# Patient Record
Sex: FEMALE | Race: WHITE | NOT HISPANIC OR LATINO | URBAN - METROPOLITAN AREA
[De-identification: names, ages, dates, MRNs, and addresses within clinical notes are randomized per-mention and may not be internally consistent; named-entity substitution may affect disease eponyms.]

---

## 2024-02-06 ENCOUNTER — PATIENT MESSAGE (OUTPATIENT)
Dept: PRIMARY CARE CLINIC | Facility: CLINIC | Age: 54
End: 2024-02-06
Payer: COMMERCIAL

## 2024-05-06 ENCOUNTER — TELEPHONE (OUTPATIENT)
Dept: UROGYNECOLOGY | Facility: CLINIC | Age: 54
End: 2024-05-06
Payer: COMMERCIAL

## 2024-05-06 NOTE — TELEPHONE ENCOUNTER
----- Message from Evelia Coe sent at 5/6/2024  4:30 PM CDT -----  Regarding: appt  Type:  Patient Returning Call      Name of who is calling:Ashley         What is request in detail:Patient is requesting a call back, would like set an appt as a new patient for prolapse consultation.        Can clinic reply by [facility]:no        What number to call back if not in [facility]:[phone]

## 2024-07-30 ENCOUNTER — TELEPHONE (OUTPATIENT)
Dept: UROGYNECOLOGY | Facility: CLINIC | Age: 54
End: 2024-07-30
Payer: COMMERCIAL

## 2024-07-30 NOTE — TELEPHONE ENCOUNTER
Placed call to screen for [facility] candidacy.     Ref provider: Self  Dx: Unknown    LVM with call back number and reason for calling.

## 2024-08-08 ENCOUNTER — TELEPHONE (OUTPATIENT)
Dept: UROGYNECOLOGY | Facility: CLINIC | Age: 54
End: 2024-08-08
Payer: COMMERCIAL

## 2024-10-03 ENCOUNTER — OFFICE VISIT (OUTPATIENT)
Dept: UROGYNECOLOGY | Facility: CLINIC | Age: 54
End: 2024-10-03
Payer: COMMERCIAL

## 2024-10-03 VITALS
BODY MASS INDEX: 22.2 KG/M2 | WEIGHT: 130.06 LBS | DIASTOLIC BLOOD PRESSURE: 72 MMHG | HEIGHT: 64 IN | HEART RATE: 73 BPM | SYSTOLIC BLOOD PRESSURE: 118 MMHG

## 2024-10-03 DIAGNOSIS — N81.11 CYSTOCELE, MIDLINE: ICD-10-CM

## 2024-10-03 DIAGNOSIS — N95.2 VAGINAL ATROPHY: ICD-10-CM

## 2024-10-03 DIAGNOSIS — R19.8 IRREGULAR BOWEL HABITS: ICD-10-CM

## 2024-10-03 DIAGNOSIS — N81.6 RECTOCELE, FEMALE: ICD-10-CM

## 2024-10-03 DIAGNOSIS — N81.4 UTEROVAGINAL PROLAPSE: ICD-10-CM

## 2024-10-03 DIAGNOSIS — K59.09 CHRONIC CONSTIPATION: ICD-10-CM

## 2024-10-03 DIAGNOSIS — R35.1 NOCTURIA: ICD-10-CM

## 2024-10-03 DIAGNOSIS — S22.32XS: Primary | ICD-10-CM

## 2024-10-03 PROBLEM — S22.32XB OPEN FRACTURE OF ONE RIB OF LEFT SIDE: Status: ACTIVE | Noted: 2024-10-03

## 2024-10-03 PROCEDURE — 99205 OFFICE O/P NEW HI 60 MIN: CPT | Mod: 25,S$GLB,, | Performed by: OBSTETRICS & GYNECOLOGY

## 2024-10-03 PROCEDURE — 3044F HG A1C LEVEL LT 7.0%: CPT | Mod: CPTII,S$GLB,, | Performed by: OBSTETRICS & GYNECOLOGY

## 2024-10-03 PROCEDURE — 87086 URINE CULTURE/COLONY COUNT: CPT | Performed by: OBSTETRICS & GYNECOLOGY

## 2024-10-03 PROCEDURE — 1159F MED LIST DOCD IN RCRD: CPT | Mod: CPTII,S$GLB,, | Performed by: OBSTETRICS & GYNECOLOGY

## 2024-10-03 PROCEDURE — 3078F DIAST BP <80 MM HG: CPT | Mod: CPTII,S$GLB,, | Performed by: OBSTETRICS & GYNECOLOGY

## 2024-10-03 PROCEDURE — 51701 INSERT BLADDER CATHETER: CPT | Mod: S$GLB,,, | Performed by: OBSTETRICS & GYNECOLOGY

## 2024-10-03 PROCEDURE — 3008F BODY MASS INDEX DOCD: CPT | Mod: CPTII,S$GLB,, | Performed by: OBSTETRICS & GYNECOLOGY

## 2024-10-03 PROCEDURE — 99999 PR PBB SHADOW E&M-EST. PATIENT-LVL IV: CPT | Mod: PBBFAC,,, | Performed by: OBSTETRICS & GYNECOLOGY

## 2024-10-03 PROCEDURE — 1160F RVW MEDS BY RX/DR IN RCRD: CPT | Mod: CPTII,S$GLB,, | Performed by: OBSTETRICS & GYNECOLOGY

## 2024-10-03 PROCEDURE — 3074F SYST BP LT 130 MM HG: CPT | Mod: CPTII,S$GLB,, | Performed by: OBSTETRICS & GYNECOLOGY

## 2024-10-03 RX ORDER — ESTRADIOL 0.05 MG/D
1 FILM, EXTENDED RELEASE TRANSDERMAL
COMMUNITY
Start: 2024-09-23

## 2024-10-03 RX ORDER — POLYETHYLENE GLYCOL 3350 17 G/17G
17 POWDER, FOR SOLUTION ORAL
COMMUNITY
Start: 2024-09-06

## 2024-10-03 RX ORDER — ALBUTEROL SULFATE 90 UG/1
INHALANT RESPIRATORY (INHALATION)
COMMUNITY
Start: 2024-08-12

## 2024-10-03 RX ORDER — DESVENLAFAXINE 50 MG/1
1 TABLET, FILM COATED, EXTENDED RELEASE ORAL EVERY MORNING
COMMUNITY
Start: 2010-10-02

## 2024-10-03 RX ORDER — PROGESTERONE 200 MG/1
CAPSULE ORAL
COMMUNITY
Start: 2024-08-24

## 2024-10-03 RX ORDER — ESTRADIOL 0.1 MG/G
CREAM VAGINAL
Qty: 42.5 G | Refills: 11 | Status: SHIPPED | OUTPATIENT
Start: 2024-10-03

## 2024-10-03 NOTE — PROGRESS NOTES
[facility] - UROGYNECOLOGY  [address] [address]    Ashley Whitley  [id number]  1970    Consulting Physician: Tri Jesus   GYN: MD Samantha  Primary M.D.: Dori Eden MD    Chief Complaint   Patient presents with    Vaginal Prolapse            HPI:     1)  UI:  (--) JC.  (--) UUI.  Has been doing pelvic floor PT.  No UI prior to this.  (--) pads. Daytime frequency: Q 3 hours.  Nocturia: Yes: 0-1/night.   (--) dysuria,  (--) hematuria,  (--) frequent UTIs.  (+) complete bladder emptying.    2)  POP:  Present since March.  Past introitus.  Symptoms:(+)  notices on wiping, bothered by it.  (--) vaginal bleeding. (--) vaginal discharge. (+) sexually active.  (--) dyspareunia.  (+)  Vaginal dryness.  (--) vaginal estrogen use.     3)  BM:  (--) constipation/straining. Tries not to strain. Was told has redundant colon. Thin caliber.  Just started citrucel  Takes magnesium 500 mg daily, which sometimes causes loose stool. Has SIBO. Has foul-smelling gas.  (--) chronic diarrhea. (--) hematochezia.  (--) fecal incontinence.  (--) fecal smearing/urgency.  (--) complete evacuation.  No splinting. Has bidet to help. No rectal prolapse.   --no major straining/lifting.     Past Medical History  History reviewed. No pertinent past medical history.   --menopausal symptoms: taking P4; was on combipatch--stopped and changed to estradiol patch; also using some T cream  --anxiety/panic attacks: controlled with pristiq    Past Surgical History  History reviewed. No pertinent surgical history.   --c/s x 2  --rhinoplasty    Hysterectomy: No    Past Ob History     x 1.  C/s x 2 (1st urgent).    Largest infant weight: 7#3oz.  no FAVD. yes episiotomy.      Gynecologic History  LMP: No LMP recorded. Patient is postmenopausal.  Age of menarche: 10 yo  Age of menopause: 53 but had 1 cycle 2024 (benign Bx)  Menstrual history: h/o normal  --had  bleeding end of  2024--reports Bx benign x 2: 1st insufficient, 2nd benign; no bleeding since  Pap test: , normal.  History of abnormal paps: No.  History of STIs:  No  Mammogram: Date of last: 2024.  Result: Normal  Colonoscopy: Date of last: .  Result:  normal per report (has had pre CA polyp in past).  Repeat due:  .    DEXA:  none; has recent rib Fx with hug    Family History  No family history on file.   Colon CA: Yes - sister ( )  Breast CA: Yes - MGM (80s)  GYN CA: No   CA: No    Social History  Social History     Tobacco Use   Smoking Status Former    Types: Cigarettes    Start date:    Smokeless Tobacco Never     Social History     Substance and Sexual Activity   Alcohol Use None   .    Social History     Substance and Sexual Activity   Drug Use Not on file     The patient is .  Resides in [place] [address].  Employment status: retired hairdresser.     Allergies  Review of patient's allergies indicates:   Allergen Reactions    Diphenhydramine hcl Swelling     Liquid only    Latex, natural rubber Itching and Rash       Medications  Current Outpatient Medications on File Prior to Visit   Medication Sig Dispense Refill    acetylcyst/lghavmN95/levomefol (CEREFOLIN NAC ORAL)       albuterol (PROVENTIL/VENTOLIN HFA) 90 mcg/actuation inhaler SMARTSI Puff(s) By Mouth Every 4 Hours      CMPD testosterone proprionate 2% in vanicream       desvenlafaxine succinate (PRISTIQ) 50 MG Tb24 Take 1 tablet by mouth every morning.      estradiol 0.05 mg/24 hr td ptsw (VIVELLE-DOT) 0.05 mg/24 hr 1 patch twice a week.      MIRALAX 17 gram PwPk Take 17 g by mouth.      progesterone (PROMETRIUM) 200 MG capsule        No current facility-administered medications on file prior to visit.       Review of Systems A 14 point ROS was reviewed with pertinent positives as noted above in the history of present illness.      Constitutional: negative  Eyes: negative  Endocrine: negative  Gastrointestinal:  "negative  Cardiovascular: negative  Respiratory: negative  Allergic/Immunologic: negative  Integumentary: negative  Psychiatric: negative  Musculoskeletal: negative   Ear/Nose/Throat: negative  Neurologic: negative  Genitourinary: SEE HPI  Hematologic/Lymphatic: negative   Breast: negative    Urogynecologic Exam  /72   Pulse 73   Ht 5' 4" (1.626 m)   Wt 59 kg (130 lb 1.1 oz)   BMI 22.33 kg/m²     GENERAL APPEARANCE:  The patient is well-developed, well-nourished.   Neck:  Supple with no thyromegaly, no carotid bruits.  Heart:  Regular rate and rhythm, no murmurs, rubs or gallops.  Lungs:  Clear.  No CVA tenderness.  Abdomen:  Soft, nontender, nondistended, no hepatosplenomegaly.  Incisions:  Pfannenstiel well-healed    PELVIC:    External genitalia:  Normal Bartholins, Skenes and labia bilaterally.    Urethra:  No caruncle, diverticulum or masses.  (+) hypermobility.    Vagina:  Atrophy (+) , no bladder masses or tender, no discharge.    Cervix:  normal appearance  Uterus: normal size, contour, position, consistency, mobility, non-tender  Adnexa: Not palpable.    POP-Q:  Aa +1; Ba +1; C -1; Ap 0; Bp 0; D -4.  Genital hiatus 4, perineal body 3, total vaginal length 10-11.    NEUROLOGIC:  Cranial nerves 2 through 12 intact.  Strength 5/5.  DTRs 2+ lower extremities.  S2 through 4 normal.  Sacral reflexes intact.    EXT: CHAPMAN, 2+ pulses bilaterally, no C/C/E    COUGH STRESS TEST:  negative  KEGEL: 1 /5    RECTAL:    External:  Normal, (--) hemorrhoids, (--) dovetailing.   Internal:   (--) tenderness, (--) masses, Normal resting tone, Normal active tone. Grossly heme NEG.     PVR: 20 mL    Impression    1. Open fracture of one rib of left side, sequela    2. Nocturia    3. Vaginal atrophy    4. Cystocele, midline    5. Rectocele, female    6. Uterovaginal prolapse    7. Chronic constipation    8. Irregular bowel habits        Initial Plan  The patient was counseled regarding these issues. The patient was given " a summary sheet containing each of these issues with possible options for evaluation and management. When appropriate, we also reviewed computer-generated diagrams specific to their diagnoses..  All questions were addressed to the patient's satisfaction.    1)  Rib fracture, L:  --DEXA--schedule    2)  Stage 2 cystocele/rectocele/uterine prolapse:  --discussed  --options: nothing vs PT vs pessary vs surgery   --if surgery:  robotic hysterectomy, take tubes (can keep ovaries, sacral colpopexy   --if surgery: pelvic US (reported EMBX benign)   --if surgery: bladder testing to see if you need treatment treatment for hidden stress leakage (sling vs bulking)   --if surgery: clearance per PCP (Meek) + labs (CBC, BMP, T&S)/EKG--had recent visit    3)  Vaginal atrophy (dryness):    --Vaginal estrogen:  --Use 0.5 grams of estrogen cream in vagina with applicator or dime-sized amount with finger (as far as can reach internally) nightly x 2 weeks, then twice a week thereafter.  You can also apply a dime-sized amount with your finger around the vaginal opening and inner lips at same frequency.     --Vaginal estrogen may help to decrease pain related to dryness with intercourse and urinary symptoms (urgency/frequency/UTIs) around menopause.     4)  Constipation, irregular habits:  --hydrate well  Controlling constipation may help bladder urgency/leakage and fiber may better control cholesterol and blood glucose.  Start daily fiber.  Take 1 tsp of fiber powder.  Mix in 8 oz of water.  Take x 3-5 days.  Then, increase fiber by 1 tsp every 3-5 days until stool is easy to pass, not too hard or too loose.  Stop and continue at that dose.    --continue magnesium  --keep food diary -- avoid triggers  --only take miralax on bad days: you may only need 1/4-1 capful    5)  Think about what you'd like to do.  Can message or call to let us know. If surgery, let us know at least 2 months before desired timeframe.     Approximately 60 min  were spent in consult, 90 % in discussion.     Thank you for requesting consultation of your patient.  I look forward to participating in their care.    Michael Borrego  Female Pelvic Medicine and Reconstructive Surgery  [facility] [place], [place]

## 2024-10-03 NOTE — PATIENT INSTRUCTIONS
1)  Rib fracture, L:  --DEXA--schedule    2)  Stage 2 cystocele/rectocele/uterine prolapse:  --discussed  --options: nothing vs PT vs pessary vs surgery   --if surgery:  robotic hysterectomy, take tubes (can keep ovaries, sacral colpopexy   --if surgery: pelvic US (reported EMBX benign)   --if surgery: bladder testing to see if you need treatment treatment for hidden stress leakage (sling vs bulking)   --if surgery: clearance per PCP (Meek) + labs (CBC, BMP, T&S)/EKG--had recent visit    3)  Vaginal atrophy (dryness):    --Vaginal estrogen:  --Use 0.5 grams of estrogen cream in vagina with applicator or dime-sized amount with finger (as far as can reach internally) nightly x 2 weeks, then twice a week thereafter.  You can also apply a dime-sized amount with your finger around the vaginal opening and inner lips at same frequency.     --Vaginal estrogen may help to decrease pain related to dryness with intercourse and urinary symptoms (urgency/frequency/UTIs) around menopause.     4)  Constipation, irregular habits:  --hydrate well  Controlling constipation may help bladder urgency/leakage and fiber may better control cholesterol and blood glucose.  Start daily fiber.  Take 1 tsp of fiber powder.  Mix in 8 oz of water.  Take x 3-5 days.  Then, increase fiber by 1 tsp every 3-5 days until stool is easy to pass, not too hard or too loose.  Stop and continue at that dose.    --continue magnesium  --keep food diary -- avoid triggers  --only take miralax on bad days: you may only need 1/4-1 capful    5)  Think about what you'd like to do.  Can message or call to let us know. If surgery, let us know at least 2 months before desired timeframe.

## 2024-10-04 LAB — BACTERIA UR CULT: NO GROWTH

## 2024-10-07 ENCOUNTER — APPOINTMENT (OUTPATIENT)
Dept: RADIOLOGY | Facility: HOSPITAL | Age: 54
End: 2024-10-07
Attending: OBSTETRICS & GYNECOLOGY
Payer: COMMERCIAL

## 2024-10-07 DIAGNOSIS — S22.32XS: ICD-10-CM

## 2024-10-07 PROCEDURE — 77080 DXA BONE DENSITY AXIAL: CPT | Mod: TC

## 2024-10-07 PROCEDURE — 77080 DXA BONE DENSITY AXIAL: CPT | Mod: 26,,, | Performed by: STUDENT IN AN ORGANIZED HEALTH CARE EDUCATION/TRAINING PROGRAM

## 2024-10-12 PROBLEM — R19.8 IRREGULAR BOWEL HABITS: Status: ACTIVE | Noted: 2024-10-12

## 2024-10-12 PROBLEM — N81.4 UTEROVAGINAL PROLAPSE: Status: ACTIVE | Noted: 2024-10-12

## 2024-10-12 PROBLEM — N95.2 VAGINAL ATROPHY: Status: ACTIVE | Noted: 2024-10-12

## 2024-10-12 PROBLEM — K59.09 CHRONIC CONSTIPATION: Status: ACTIVE | Noted: 2024-10-12

## 2024-10-12 PROBLEM — N81.6 RECTOCELE, FEMALE: Status: ACTIVE | Noted: 2024-10-12

## 2024-10-12 PROBLEM — N81.11 CYSTOCELE, MIDLINE: Status: ACTIVE | Noted: 2024-10-12

## 2024-10-12 PROBLEM — R35.1 NOCTURIA: Status: ACTIVE | Noted: 2024-10-12

## 2024-10-14 ENCOUNTER — TELEPHONE (OUTPATIENT)
Dept: UROGYNECOLOGY | Facility: CLINIC | Age: 54
End: 2024-10-14
Payer: COMMERCIAL

## 2024-10-14 NOTE — TELEPHONE ENCOUNTER
Contacted patient to relay Dr. Borrego's below message.  Patient reporting ready to schedule surgery.  Patient also reporting unsure when needs to have follow up DEXA scan.     Wants food diary- will send to patients email provided.   [email or web address]    Patient verbalized understanding and denies other needs at this time. Call ended.      ----- Message from Michael Borrego MD sent at 10/11/2024  9:37 PM CDT -----  1)  Please let patient know that bone density test shows osteopenia (weak bones but not osteoporosis).  Risk of fracture is not elevated.  So, she should do the following:  --Take 600 mg calcium every AM and 600 mg calcium every PM (for total of 1200 mg daily).  Take 400 IU vitamin D in AM and 400 IU vitamin D in the PM (for total of 800 IU daily).    --Start weight bearing exercises in improve bone density:  This type of exercise forces you to work against gravity. Some examples of weight-bearing exercises include weight training, walking, hiking, jogging, climbing stairs, tennis, and dancing. Examples of exercises that are not weight-bearing include swimming and bicycling. Although these activities help build and maintain strong muscles and have excellent cardiovascular benefits, they are not the best way to exercise your bones.  --Follow up for repeat DEXA (bone density testing) as scheduled.     2) Also, her urine culture was negative.   Thanks!!

## 2024-10-16 ENCOUNTER — PATIENT MESSAGE (OUTPATIENT)
Dept: UROGYNECOLOGY | Facility: CLINIC | Age: 54
End: 2024-10-16
Payer: COMMERCIAL

## 2024-10-17 DIAGNOSIS — N81.2 UTEROVAGINAL PROLAPSE, INCOMPLETE: Primary | ICD-10-CM

## 2024-10-17 DIAGNOSIS — N81.6 RECTOCELE, FEMALE: ICD-10-CM

## 2024-10-17 DIAGNOSIS — N81.11 CYSTOCELE, MIDLINE: ICD-10-CM

## 2024-10-17 DIAGNOSIS — R35.1 NOCTURIA: ICD-10-CM

## 2024-10-17 DIAGNOSIS — N81.4 UTEROVAGINAL PROLAPSE: Primary | ICD-10-CM

## 2024-10-21 ENCOUNTER — HOSPITAL ENCOUNTER (OUTPATIENT)
Dept: RADIOLOGY | Facility: OTHER | Age: 54
Discharge: HOME | End: 2024-10-21
Attending: OBSTETRICS & GYNECOLOGY
Payer: COMMERCIAL

## 2024-10-21 DIAGNOSIS — N81.2 UTEROVAGINAL PROLAPSE, INCOMPLETE: ICD-10-CM

## 2024-10-21 PROCEDURE — 76830 TRANSVAGINAL US NON-OB: CPT | Mod: 26,,, | Performed by: RADIOLOGY

## 2024-10-21 PROCEDURE — 76856 US EXAM PELVIC COMPLETE: CPT | Mod: 26,,, | Performed by: RADIOLOGY

## 2024-10-21 PROCEDURE — 76830 TRANSVAGINAL US NON-OB: CPT | Mod: TC

## 2024-10-21 PROCEDURE — 76856 US EXAM PELVIC COMPLETE: CPT | Mod: TC

## 2024-10-22 ENCOUNTER — TELEPHONE (OUTPATIENT)
Dept: UROGYNECOLOGY | Facility: CLINIC | Age: 54
End: 2024-10-22
Payer: COMMERCIAL

## 2024-10-22 NOTE — TELEPHONE ENCOUNTER
Contacted patient to relay Dr. Borrego's below message. Patient verbalized understanding and denies other needs at this time. Call ended.      ----- Message from Michael Borrego MD sent at 10/21/2024  6:07 PM CDT -----  Please let patient know that pelvic US looks ok. She has a large fibroid (benign growth) and a smaller one. These will be removed at time of hysterectomy. Ovaries were not seen, but they are sometimes difficult to see if small and behind the uterus, bowel, etc.  But, there is nothing obviously abnormal on the US. Thanks!

## 2024-12-18 ENCOUNTER — ANESTHESIA EVENT (OUTPATIENT)
Dept: SURGERY | Facility: OTHER | Age: 54
End: 2024-12-18
Payer: COMMERCIAL

## 2024-12-18 RX ORDER — PREGABALIN 75 MG/1
75 CAPSULE ORAL ONCE
OUTPATIENT
Start: 2024-12-18 | End: 2024-12-18

## 2024-12-18 RX ORDER — LIDOCAINE HYDROCHLORIDE 10 MG/ML
0.5 INJECTION, SOLUTION EPIDURAL; INFILTRATION; INTRACAUDAL; PERINEURAL ONCE
OUTPATIENT
Start: 2024-12-18 | End: 2024-12-18

## 2024-12-18 RX ORDER — ACETAMINOPHEN 500 MG
1000 TABLET ORAL
OUTPATIENT
Start: 2024-12-18 | End: 2024-12-18

## 2024-12-18 RX ORDER — SODIUM CHLORIDE, SODIUM LACTATE, POTASSIUM CHLORIDE, CALCIUM CHLORIDE 600; 310; 30; 20 MG/100ML; MG/100ML; MG/100ML; MG/100ML
INJECTION, SOLUTION INTRAVENOUS CONTINUOUS
OUTPATIENT
Start: 2024-12-18

## 2024-12-19 ENCOUNTER — OFFICE VISIT (OUTPATIENT)
Dept: UROGYNECOLOGY | Facility: CLINIC | Age: 54
End: 2024-12-19
Payer: COMMERCIAL

## 2024-12-19 ENCOUNTER — PROCEDURE VISIT (OUTPATIENT)
Dept: UROGYNECOLOGY | Facility: CLINIC | Age: 54
End: 2024-12-19
Payer: COMMERCIAL

## 2024-12-19 ENCOUNTER — HOSPITAL ENCOUNTER (OUTPATIENT)
Dept: PREADMISSION TESTING | Facility: OTHER | Age: 54
Discharge: HOME | End: 2024-12-19
Attending: OBSTETRICS & GYNECOLOGY
Payer: COMMERCIAL

## 2024-12-19 VITALS
DIASTOLIC BLOOD PRESSURE: 74 MMHG | HEIGHT: 64 IN | WEIGHT: 129.44 LBS | BODY MASS INDEX: 22.1 KG/M2 | SYSTOLIC BLOOD PRESSURE: 116 MMHG

## 2024-12-19 VITALS
RESPIRATION RATE: 16 BRPM | BODY MASS INDEX: 21.34 KG/M2 | SYSTOLIC BLOOD PRESSURE: 122 MMHG | OXYGEN SATURATION: 99 % | DIASTOLIC BLOOD PRESSURE: 74 MMHG | HEART RATE: 78 BPM | HEIGHT: 64 IN | WEIGHT: 125 LBS | TEMPERATURE: 98 F

## 2024-12-19 DIAGNOSIS — Z01.818 PREOP TESTING: Primary | ICD-10-CM

## 2024-12-19 DIAGNOSIS — N81.4 UTEROVAGINAL PROLAPSE: Primary | ICD-10-CM

## 2024-12-19 DIAGNOSIS — N81.2 UTEROVAGINAL PROLAPSE, INCOMPLETE: ICD-10-CM

## 2024-12-19 DIAGNOSIS — N95.2 VAGINAL ATROPHY: ICD-10-CM

## 2024-12-19 DIAGNOSIS — K59.09 CHRONIC CONSTIPATION: ICD-10-CM

## 2024-12-19 DIAGNOSIS — N81.11 CYSTOCELE, MIDLINE: ICD-10-CM

## 2024-12-19 DIAGNOSIS — R35.1 NOCTURIA: ICD-10-CM

## 2024-12-19 DIAGNOSIS — N39.3 SUI (STRESS URINARY INCONTINENCE, FEMALE): ICD-10-CM

## 2024-12-19 DIAGNOSIS — N81.6 RECTOCELE, FEMALE: ICD-10-CM

## 2024-12-19 LAB
BASOPHILS # BLD AUTO: 0.02 K/UL (ref 0–0.2)
BASOPHILS NFR BLD: 0.3 % (ref 0–1.9)
BILIRUB SERPL-MCNC: NORMAL MG/DL
BLOOD URINE, POC: NORMAL
CLARITY, POC UA: CLEAR
COLOR, POC UA: NORMAL
DIFFERENTIAL METHOD BLD: ABNORMAL
EOSINOPHIL # BLD AUTO: 0 K/UL (ref 0–0.5)
EOSINOPHIL NFR BLD: 0.3 % (ref 0–8)
ERYTHROCYTE [DISTWIDTH] IN BLOOD BY AUTOMATED COUNT: 13.3 % (ref 11.5–14.5)
GLUCOSE UR QL STRIP: NORMAL
HCT VFR BLD AUTO: 38.8 % (ref 37–48.5)
HGB BLD-MCNC: 13.1 G/DL (ref 12–16)
IMM GRANULOCYTES # BLD AUTO: 0.02 K/UL (ref 0–0.04)
IMM GRANULOCYTES NFR BLD AUTO: 0.3 % (ref 0–0.5)
KETONES UR QL STRIP: NORMAL
LEUKOCYTE ESTERASE URINE, POC: NORMAL
LYMPHOCYTES # BLD AUTO: 2 K/UL (ref 1–4.8)
LYMPHOCYTES NFR BLD: 26 % (ref 18–48)
MCH RBC QN AUTO: 32 PG (ref 27–31)
MCHC RBC AUTO-ENTMCNC: 33.8 G/DL (ref 32–36)
MCV RBC AUTO: 95 FL (ref 82–98)
MONOCYTES # BLD AUTO: 0.5 K/UL (ref 0.3–1)
MONOCYTES NFR BLD: 6.3 % (ref 4–15)
NEUTROPHILS # BLD AUTO: 5.1 K/UL (ref 1.8–7.7)
NEUTROPHILS NFR BLD: 66.8 % (ref 38–73)
NITRITE, POC UA: NORMAL
NRBC BLD-RTO: 0 /100 WBC
PH, POC UA: 7
PLATELET # BLD AUTO: 328 K/UL (ref 150–450)
PMV BLD AUTO: 8.5 FL (ref 9.2–12.9)
PROTEIN, POC: NORMAL
RBC # BLD AUTO: 4.09 M/UL (ref 4–5.4)
SPECIFIC GRAVITY, POC UA: 1.01
UROBILINOGEN, POC UA: NORMAL
WBC # BLD AUTO: 7.63 K/UL (ref 3.9–12.7)

## 2024-12-19 PROCEDURE — 51741 ELECTRO-UROFLOWMETRY FIRST: CPT | Mod: S$GLB,,, | Performed by: OBSTETRICS & GYNECOLOGY

## 2024-12-19 PROCEDURE — 51728 CYSTOMETROGRAM W/VP: CPT | Mod: S$GLB,,, | Performed by: OBSTETRICS & GYNECOLOGY

## 2024-12-19 PROCEDURE — 81002 URINALYSIS NONAUTO W/O SCOPE: CPT | Mod: S$GLB,,, | Performed by: OBSTETRICS & GYNECOLOGY

## 2024-12-19 PROCEDURE — 99499 UNLISTED E&M SERVICE: CPT | Mod: S$GLB,,, | Performed by: OBSTETRICS & GYNECOLOGY

## 2024-12-19 PROCEDURE — 85025 COMPLETE CBC W/AUTO DIFF WBC: CPT | Performed by: ANESTHESIOLOGY

## 2024-12-19 PROCEDURE — 99999 PR PBB SHADOW E&M-EST. PATIENT-LVL III: CPT | Mod: PBBFAC,,, | Performed by: NURSE PRACTITIONER

## 2024-12-19 PROCEDURE — 51797 INTRAABDOMINAL PRESSURE TEST: CPT | Mod: S$GLB,,, | Performed by: OBSTETRICS & GYNECOLOGY

## 2024-12-19 PROCEDURE — 51784 ANAL/URINARY MUSCLE STUDY: CPT | Mod: S$GLB,,, | Performed by: OBSTETRICS & GYNECOLOGY

## 2024-12-19 RX ORDER — METFORMIN HYDROCHLORIDE 500 MG/1
500 TABLET ORAL NIGHTLY
COMMUNITY
Start: 2024-10-15

## 2024-12-19 RX ORDER — CIPROFLOXACIN 500 MG/1
500 TABLET ORAL
Status: COMPLETED | OUTPATIENT
Start: 2024-12-19 | End: 2024-12-19

## 2024-12-19 RX ORDER — MULTIVITAMIN WITH IRON
TABLET ORAL DAILY
COMMUNITY

## 2024-12-19 RX ADMIN — CIPROFLOXACIN 500 MG: 500 TABLET ORAL at 04:12

## 2024-12-19 NOTE — ANESTHESIA PREPROCEDURE EVALUATION
12/19/2024  Ashley Whitley is a 54 y.o., female.      Pre-op Assessment    I have reviewed the Patient Summary Reports.     I have reviewed the Nursing Notes. I have reviewed the NPO Status.   I have reviewed the Medications.     Review of Systems  Anesthesia Hx:  No problems with previous Anesthesia             Denies Family Hx of Anesthesia complications.    Denies Personal Hx of Anesthesia complications.                    Social:  Non-Smoker       Hematology/Oncology:  Hematology Normal   Oncology Normal                                   EENT/Dental:  EENT/Dental Normal           Cardiovascular:  Cardiovascular Normal                                              Pulmonary:  Pulmonary Normal                       Renal/:  Renal/ Normal                 Hepatic/GI:  Hepatic/GI Normal                    Musculoskeletal:  Musculoskeletal Normal                Neurological:  Neurology Normal                                      Endocrine:  Endocrine Normal            Dermatological:  Skin Normal    Psych:  Psychiatric Normal                    Physical Exam  General: Well nourished and Alert    Airway:  Mallampati: II   Mouth Opening: Normal  Tongue: Normal    Dental:  Intact        Anesthesia Plan  Type of Anesthesia, risks & benefits discussed:    Anesthesia Type: Gen ETT  Intra-op Monitoring Plan: Standard ASA Monitors  Post Op Pain Control Plan: multimodal analgesia  Induction:  IV  Airway Plan: Video  Informed Consent: Informed consent signed with the Patient and all parties understand the risks and agree with anesthesia plan.  All questions answered.   ASA Score: 2  Anesthesia Plan Notes: Labs per surgeon    Ready For Surgery From Anesthesia Perspective.     .

## 2024-12-19 NOTE — DISCHARGE INSTRUCTIONS
Information to Prepare you for your Surgery    PRE-ADMIT TESTING   [address]  [place] [facility]  [address]     Your surgery has been scheduled at [facility]. We are pleased to have the opportunity to serve you. For Further Information please call [phone].    On the day of surgery please report to Registration on the 1st floor of the [facility].    CONTACT YOUR PHYSICIAN'S OFFICE THE DAY PRIOR TO YOUR SURGERY TO OBTAIN YOUR ARRIVAL TIME.     The evening before surgery do not eat anything after 9 p.m. ( this includes hard candy, chewing gum and mints).  You may only have GATORADE, POWERADE AND WATER  from 9 p.m. until you leave your home.   DO NOT DRINK ANY LIQUIDS ON THE WAY TO THE HOSPITAL.      Why does your anesthesiologist allow you to drink Gatorade/Powerade before surgery?  Gatorade/Powerade helps to increase your comfort before surgery and to decrease your nausea after surgery.   The carbohydrates in Gatorade/Powerade help reduce your body's stress response to surgery.  If you are a diabetic-drink only water prior to surgery.    Outpatient Surgery- May allow 2 adults (18 and older)/ Support Persons (1 being the designated ) for all surgical/procedural patients. A breastfeeding mother will be allowed her infant and 2 adult Support Persons. No one under the age of 18 will be allowed in the building.    MEDICATION INSTRUCTIONS: TAKE medications checked off by the Anesthesiologist on your Medication List.    Surgery Patients:  If you take ASPIRIN - Your PHYSICIAN/SURGEON will need to inform you IF/OR when you need to stop taking aspirin prior to your surgery.     Starting the week prior to surgery, do not take any medications containing IBUPROFEN or NSAIDS (Advil, Aleve, BC, Celebrex, Goody's, Ketorolac, Meloxicam, Mobic, Motrin, Naproxen, Toradol, etc).  If you are not sure if you should take a medicine please call your surgeon's office.  You may take Tylenol.    Do  Not Wear any make-up (especially eye make-up) to surgery. Please remove any false eyelashes or eyelash extensions. If you arrive the day of surgery with makeup/eyelashes on you will be required to remove prior to surgery. (There is a risk of corneal abrasions if eye makeup/eyelash extensions are not removed)    Leave all valuables at home.   Do Not wear any jewelry or watches, including any metal in body piercings. Jewelry must be removed prior to coming to the hospital.  There is a possibility that rings that are unable to be removed may be cut off if they are on the surgical extremity.    Please remove all hair extensions, wigs, clips and any other metal accessories/ ornaments from your hair.  These items may pose a flammable/fire risk in Surgery and must be removed.    Do not shave your surgical area at least 5 days prior to your surgery. The surgical prep will be performed at the hospital according to Infection Control regulations.    Contact Lens must be removed before surgery. Either do not wear the contact lens or bring a case and solution for storage.  Please bring a container for eyeglasses or dentures as required.  Bring any paperwork your physician has provided, such as consent forms,  history and physicals, doctor's orders, etc.   Bring comfortable clothes that are loose fitting to wear upon discharge. Take into consideration the type of surgery being performed.  Maintain your diet as advised per your physician the day prior to surgery.    Adequate rest the night before surgery is advised.   Park in the Parking lot behind the hospital or in the [place] [facility] across the street from the parking lot. Parking is complimentary.  If you will be discharged the same day as your procedure, please arrange for a responsible adult to drive you home or to accompany you if traveling by taxi.   YOU WILL NOT BE PERMITTED TO DRIVE OR TO LEAVE THE HOSPITAL ALONE AFTER SURGERY.   If you are being discharged the  same day, it is strongly recommended that you arrange for someone to remain with you for the first 24 hrs following your surgery.    The Surgeon will speak to your family/visitor after your surgery regarding the outcome of your surgery and post op care.  The Surgeon may speak to you after your surgery, but there is a possibility you may not remember the details.  Please check with your family members regarding the conversation with the Surgeon.    We strongly recommend whoever is bringing you home be present for discharge instructions.  This will ensure a thorough understanding for your post op home care.              Bathing Instructions with Hibiclens  Shower the evening before and morning of your procedure with Chlorhexidine (Hibiclens)    Do not use Chlorhexidine on your face or genitals. Do not get in your eyes.  Wash your face with water and your regular face wash/soap  Use your regular shampoo  Apply Chlorhexidine (Hibiclens) directly on your skin or on a wet washcloth and wash gently. When showering: Move away from the shower stream when applying Chlorhexidine (Hibiclens) to avoid rinsing off too soon.  Rinse thoroughly with warm water  Do not dilute Chlorhexidine (Hibiclens)   Dry off as usual, do not use any deodorant, powder, body lotions, perfume, after shave or cologne.     If the patient has fever, cough, or signs/symptoms of Flu or Covid please do not come in for your surgery.   Contact your surgeon and your primary care physician for further instructions.   Please also call [facility] Outpatient Surgery [phone]. The unit opens at 5 AM.    If applicable, please bring your blood pressure & diabetes medications the day of surgery.

## 2024-12-19 NOTE — PROGRESS NOTES
Urogyn follow up  12/19/2024  .  [facility] - UROGYNECOLOGY  [address] [address]    Ashley Whitley  [id number]  1970      Ashley Whitley is a 54 y.o. here for a urogyn preop visit    Last HPI from 10/03/2024  1)  UI:  (--) CJ.  (--) UUI.  Has been doing pelvic floor PT.  No UI prior to this.  (--) pads. Daytime frequency: Q 3 hours.  Nocturia: Yes: 0-1/night.   (--) dysuria,  (--) hematuria,  (--) frequent UTIs.  (+) complete bladder emptying.     2)  POP:  Present since March.  Past introitus.  Symptoms:(+)  notices on wiping, bothered by it.  (--) vaginal bleeding. (--) vaginal discharge. (+) sexually active.  (--) dyspareunia.  (+)  Vaginal dryness.  (--) vaginal estrogen use.      3)  BM:  (--) constipation/straining. Tries not to strain. Was told has redundant colon. Thin caliber.  Just started citrucel  Takes magnesium 500 mg daily, which sometimes causes loose stool. Has SIBO. Has foul-smelling gas.  (--) chronic diarrhea. (--) hematochezia.  (--) fecal incontinence.  (--) fecal smearing/urgency.  (--) complete evacuation.  No splinting. Has bidet to help. No rectal prolapse.   --no major straining/lifting.     10/21/2024  Pelvic ultrasound  Uterus: Size: 11 x 9 x 8 cm   Masses: Dominant fundal fibroid measuring 7 cm distorting the endometrial stripe.  Second small 1 cm fibroid adjacent to it.   Endometrium: Not able to be visualized.   Right ovary: Not seen with certainty   Left ovary: Not seen with certainty   Free Fluid: None.   Impression:   Fibroid uterus        12/19/2024:  Voiding every 2-3 hours during the day   Denies constipation    12/19/2024  Suds  Urine dipstick: neg.     1.  VOIDING PHASE:       a.  Uroflowmetry:  Prolapse reduction: No  Voided volume:  47 mL   Voiding time:   21 seconds  Max flow:  13 mL/s  Avg flow:   6 mL/s   PVR:   15 mL     The overall configuration of this uroflow study was with insufficient volume for interpretation.        b.  Pressure flow:  Prolapse reduction: No  Voided volume:   286 mL  Voiding time:   259 seconds  Peak flow:  4 mL/s   Avg flow:  2 mL/s  Max det pressure:  64  cm H20  Det pressure at max flow: 56 cm H20  Void initiated by detrusor contraction.    Urethral relaxation (EMG):  present.    PVR (calculated):  232 mL.  Patient able to void an additional 200 mL on toilet after removing catheters.     The overall configuration of this pressure flow study was prolonged with concern for voiding dysfunction.       2.  FILLING PHASE:  1st desire: 149 mL  Normal desire:  316 mL  Strong desire:  441 mL  Urgency:  518 mL  Compliance (calculated)  approx 150 mL/cm H20  EMG activity during filling:  unchanged  Detrusor contractions observed: No.       3.  URETHRAL FUNCTION/STORAGE PHASE:     a.  WITH prolapse reduction:  CLPP (150 mL): Positive  at  144 cm H20  VLPP (150 mL): Negative  at  98 cm H20  CLPP (300 mL): Positive  at  136 cm H20  VLPP (300 mL): Negative  at  100 cm H20   CLPP (450 mL): Positive  at  137 cm H20  VLPP (450 mL): Positive  at  123 cm H20  CLPP (MAX ):    Positive  at  158 cm H20  VLPP (MAX):     Positive  at  95 cm H20     These findings are consistent with Positive urodynamic stress incontinence.     Assessment:  UF with insufficient volume for interpretation.   PF prolonged with concern for voiding dysfunction.    Compliance  normal.  Max capacity  518 mL.  DO (--).  JC (+).           Past Medical History:   Diagnosis Date    Hyperlipemia      --menopausal symptoms: taking P4; was on combipatch--stopped and changed to estradiol patch; also using some T cream  --anxiety/panic attacks: controlled with pristiq  Past Surgical History:   Procedure Laterality Date     SECTION      x2    RHINOPLASTY         No family history on file.    Social History     Socioeconomic History    Marital status:    Tobacco Use    Smoking status: Former     Types: Cigarettes     Start date:     Smokeless  tobacco: Never   Substance and Sexual Activity    Alcohol use: Yes     Alcohol/week: 14.0 standard drinks of alcohol     Types: 14 Glasses of wine per week    Drug use: Never       Current Outpatient Medications   Medication Sig Dispense Refill    calcium carbonate/vitamin D3 (VITAMIN D-3 ORAL) Take by mouth once daily.      CALCIUM ORAL Take by mouth once daily.      CMPD testosterone proprionate 2% in vanicream       desvenlafaxine succinate (PRISTIQ) 50 MG Tb24 Take 1 tablet by mouth every morning.      estradioL (ESTRACE) 0.01 % (0.1 mg/gram) vaginal cream 0.5 grams with applicator or dime-sized amount with finger in vagina nightly x 2 weeks, then twice a week thereafter 42.5 g 11    estradiol 0.05 mg/24 hr td ptsw (VIVELLE-DOT) 0.05 mg/24 hr 1 patch twice a week.      IBUPROFEN ORAL Take by mouth as needed.      MAGNESIUM ORAL Take by mouth once daily.      metFORMIN (GLUCOPHAGE) 500 MG tablet Take 500 mg by mouth every evening.      omega-3 fatty acids/fish oil (FISH OIL-OMEGA-3 FATTY ACIDS) 300-1,000 mg capsule Take by mouth once daily.      progesterone (PROMETRIUM) 200 MG capsule       THYROID ORAL Take by mouth once daily. Thyroid Support      UNABLE TO FIND Take by mouth once daily. AG1 Super Greens       No current facility-administered medications for this visit.       Review of patient's allergies indicates:   Allergen Reactions    Diphenhydramine hcl Swelling     Liquid only    Latex, natural rubber Itching and Rash       Well woman:  Pap test: 2024, normal.  History of abnormal paps: No.  History of STIs:  No  Mammogram: Date of last: 1/2024.  Result: Normal  Colonoscopy: Date of last: 2021.  Result:  normal per report (has had pre CA polyp in past).  Repeat due:  2026.    DEXA:  none; has recent rib Fx with hug    ROS:  As per HPI.      Exam  There were no vitals taken for this visit.  General: alert and oriented, no acute distress  Respiratory: normal respiratory effort  Abd: soft, non-tender,  non-distended    Pelvic:  deferred    Impression  No diagnosis found.  We reviewed the above issues and discussed options for short-term versus long-term management of her problems.   Plan:   PATIENT NEEDS TO SIGN SCANNED IN CONSENTS DOS: Patient consented for robotic assisted laparoscopic hysterectomy and sacrocolpopexy with synthetic mesh, possible anterior/posterior repair with perineorrhaphy, possible uterosacral suspension, possible sacrospinous fixation, removal of tubes, possible removal of ovaries (plan to keep unless otherwise indicated), possible laparotomy, placement of synthetic midurethral sling, and cystourethroscopy.   R/B/A reviewed. Specific risks reviewed include:  infection, bleeding, need for blood transfusion, damage to surrounding structures, anesthesia risks, death, heart attack, stroke, mesh erosion/extrusion, pain, dyspareunia, urinary retention, voiding dysfunction, urinary incontinence, exacerbation of urinary urge incontinence, and need for further surgeries.  We reviewed potential for failure of POP defect repair and need for future surgery, with no way of predicting risk.  She understands success rate of ASC approaches 85%.  Success rate of midurethral sling for JC was reviewed as 80-85%, and she understands that this will not necessarily impact other types of urinary incontinence.  Alternatives reviewed include: pessary/PT for POP and pessary/periurethral injections/PT/medication for JC.    T&S, urine HCG on DOS  Preoperative appointment with PCP or cardiology: Yes - pending  VTE Prophylaxis:  heparin 5000 u SQ TID (1st dose 2hrs preop) + SCDs  Patient instructed on Magnesium citrate and chlorahexadine/dial soap prep to perform day before & AM of surgery.   Proceed to OR for above-mentioned procedure.      20 minutes were spent in face to face time with this patient  100 % of this time was spent in counseling and/or coordination of care      Liza Dumont, P-BC [facility]  Division of Female Pelvic Medicine and Reconstructive Surgery  Department of Obstetrics & Gynecology

## 2024-12-19 NOTE — H&P (VIEW-ONLY)
Urogyn follow up  12/19/2024  .  [facility] - UROGYNECOLOGY  [address] [address]    Ashley Whitley  [id number]  1970      Ashley Whitley is a 54 y.o. here for a urogyn preop visit    Last HPI from 10/03/2024  1)  UI:  (--) JC.  (--) UUI.  Has been doing pelvic floor PT.  No UI prior to this.  (--) pads. Daytime frequency: Q 3 hours.  Nocturia: Yes: 0-1/night.   (--) dysuria,  (--) hematuria,  (--) frequent UTIs.  (+) complete bladder emptying.     2)  POP:  Present since March.  Past introitus.  Symptoms:(+)  notices on wiping, bothered by it.  (--) vaginal bleeding. (--) vaginal discharge. (+) sexually active.  (--) dyspareunia.  (+)  Vaginal dryness.  (--) vaginal estrogen use.      3)  BM:  (--) constipation/straining. Tries not to strain. Was told has redundant colon. Thin caliber.  Just started citrucel  Takes magnesium 500 mg daily, which sometimes causes loose stool. Has SIBO. Has foul-smelling gas.  (--) chronic diarrhea. (--) hematochezia.  (--) fecal incontinence.  (--) fecal smearing/urgency.  (--) complete evacuation.  No splinting. Has bidet to help. No rectal prolapse.   --no major straining/lifting.     10/21/2024  Pelvic ultrasound  Uterus: Size: 11 x 9 x 8 cm   Masses: Dominant fundal fibroid measuring 7 cm distorting the endometrial stripe.  Second small 1 cm fibroid adjacent to it.   Endometrium: Not able to be visualized.   Right ovary: Not seen with certainty   Left ovary: Not seen with certainty   Free Fluid: None.   Impression:   Fibroid uterus        12/19/2024:  Voiding every 2-3 hours during the day   Denies constipation    12/19/2024  Suds  Urine dipstick: neg.     1.  VOIDING PHASE:       a.  Uroflowmetry:  Prolapse reduction: No  Voided volume:  47 mL   Voiding time:   21 seconds  Max flow:  13 mL/s  Avg flow:   6 mL/s   PVR:   15 mL     The overall configuration of this uroflow study was with insufficient volume for interpretation.        b.  Pressure flow:  Prolapse reduction: No  Voided volume:   286 mL  Voiding time:   259 seconds  Peak flow:  4 mL/s   Avg flow:  2 mL/s  Max det pressure:  64  cm H20  Det pressure at max flow: 56 cm H20  Void initiated by detrusor contraction.    Urethral relaxation (EMG):  present.    PVR (calculated):  232 mL.  Patient able to void an additional 200 mL on toilet after removing catheters.     The overall configuration of this pressure flow study was prolonged with concern for voiding dysfunction.       2.  FILLING PHASE:  1st desire: 149 mL  Normal desire:  316 mL  Strong desire:  441 mL  Urgency:  518 mL  Compliance (calculated)  approx 150 mL/cm H20  EMG activity during filling:  unchanged  Detrusor contractions observed: No.       3.  URETHRAL FUNCTION/STORAGE PHASE:     a.  WITH prolapse reduction:  CLPP (150 mL): Positive  at  144 cm H20  VLPP (150 mL): Negative  at  98 cm H20  CLPP (300 mL): Positive  at  136 cm H20  VLPP (300 mL): Negative  at  100 cm H20   CLPP (450 mL): Positive  at  137 cm H20  VLPP (450 mL): Positive  at  123 cm H20  CLPP (MAX ):    Positive  at  158 cm H20  VLPP (MAX):     Positive  at  95 cm H20     These findings are consistent with Positive urodynamic stress incontinence.     Assessment:  UF with insufficient volume for interpretation.   PF prolonged with concern for voiding dysfunction.    Compliance  normal.  Max capacity  518 mL.  DO (--).  JC (+).           Past Medical History:   Diagnosis Date    Hyperlipemia      --menopausal symptoms: taking P4; was on combipatch--stopped and changed to estradiol patch; also using some T cream  --anxiety/panic attacks: controlled with pristiq  Past Surgical History:   Procedure Laterality Date     SECTION      x2    RHINOPLASTY         No family history on file.    Social History     Socioeconomic History    Marital status:    Tobacco Use    Smoking status: Former     Types: Cigarettes     Start date:     Smokeless  tobacco: Never   Substance and Sexual Activity    Alcohol use: Yes     Alcohol/week: 14.0 standard drinks of alcohol     Types: 14 Glasses of wine per week    Drug use: Never       Current Outpatient Medications   Medication Sig Dispense Refill    calcium carbonate/vitamin D3 (VITAMIN D-3 ORAL) Take by mouth once daily.      CALCIUM ORAL Take by mouth once daily.      CMPD testosterone proprionate 2% in vanicream       desvenlafaxine succinate (PRISTIQ) 50 MG Tb24 Take 1 tablet by mouth every morning.      estradioL (ESTRACE) 0.01 % (0.1 mg/gram) vaginal cream 0.5 grams with applicator or dime-sized amount with finger in vagina nightly x 2 weeks, then twice a week thereafter 42.5 g 11    estradiol 0.05 mg/24 hr td ptsw (VIVELLE-DOT) 0.05 mg/24 hr 1 patch twice a week.      IBUPROFEN ORAL Take by mouth as needed.      MAGNESIUM ORAL Take by mouth once daily.      metFORMIN (GLUCOPHAGE) 500 MG tablet Take 500 mg by mouth every evening.      omega-3 fatty acids/fish oil (FISH OIL-OMEGA-3 FATTY ACIDS) 300-1,000 mg capsule Take by mouth once daily.      progesterone (PROMETRIUM) 200 MG capsule       THYROID ORAL Take by mouth once daily. Thyroid Support      UNABLE TO FIND Take by mouth once daily. AG1 Super Greens       No current facility-administered medications for this visit.       Review of patient's allergies indicates:   Allergen Reactions    Diphenhydramine hcl Swelling     Liquid only    Latex, natural rubber Itching and Rash       Well woman:  Pap test: 2024, normal.  History of abnormal paps: No.  History of STIs:  No  Mammogram: Date of last: 1/2024.  Result: Normal  Colonoscopy: Date of last: 2021.  Result:  normal per report (has had pre CA polyp in past).  Repeat due:  2026.    DEXA:  none; has recent rib Fx with hug    ROS:  As per HPI.      Exam  There were no vitals taken for this visit.  General: alert and oriented, no acute distress  Respiratory: normal respiratory effort  Abd: soft, non-tender,  non-distended    Pelvic:  deferred    Impression  No diagnosis found.  We reviewed the above issues and discussed options for short-term versus long-term management of her problems.   Plan:   PATIENT NEEDS TO SIGN SCANNED IN CONSENTS DOS: Patient consented for robotic assisted laparoscopic hysterectomy and sacrocolpopexy with synthetic mesh, possible anterior/posterior repair with perineorrhaphy, possible uterosacral suspension, possible sacrospinous fixation, removal of tubes, possible removal of ovaries (plan to keep unless otherwise indicated), possible laparotomy, placement of synthetic midurethral sling, and cystourethroscopy.   R/B/A reviewed. Specific risks reviewed include:  infection, bleeding, need for blood transfusion, damage to surrounding structures, anesthesia risks, death, heart attack, stroke, mesh erosion/extrusion, pain, dyspareunia, urinary retention, voiding dysfunction, urinary incontinence, exacerbation of urinary urge incontinence, and need for further surgeries.  We reviewed potential for failure of POP defect repair and need for future surgery, with no way of predicting risk.  She understands success rate of ASC approaches 85%.  Success rate of midurethral sling for JC was reviewed as 80-85%, and she understands that this will not necessarily impact other types of urinary incontinence.  Alternatives reviewed include: pessary/PT for POP and pessary/periurethral injections/PT/medication for JC.    T&S, urine HCG on DOS  Preoperative appointment with PCP or cardiology: Yes - pending  VTE Prophylaxis:  heparin 5000 u SQ TID (1st dose 2hrs preop) + SCDs  Patient instructed on Magnesium citrate and chlorahexadine/dial soap prep to perform day before & AM of surgery.   Proceed to OR for above-mentioned procedure.      20 minutes were spent in face to face time with this patient  100 % of this time was spent in counseling and/or coordination of care      Liza Dumont, P-BC [facility]  Division of Female Pelvic Medicine and Reconstructive Surgery  Department of Obstetrics & Gynecology

## 2024-12-22 NOTE — PROCEDURES
TITLE OF OPERATION:  Complex cystometry.  Complex uroflowmetry.  Electromyography with surface electrodes.  Pressure voiding flow study.  Abdominal pressure measurement.  Leak point pressure measurement.    INDICATIONS:  1)  UI:  (--) JC.  (--) UUI.  Has been doing pelvic floor PT.  No UI prior to this.  (--) pads. Daytime frequency: Q 3 hours.  Nocturia: Yes: 0-1/night.   (--) dysuria,  (--) hematuria,  (--) frequent UTIs.  (+) complete bladder emptying.     2)  POP:  Present since March.  Past introitus.  Symptoms:(+)  notices on wiping, bothered by it.  (--) vaginal bleeding. (--) vaginal discharge. (+) sexually active.  (--) dyspareunia.  (+)  Vaginal dryness.  (--) vaginal estrogen use.   --POP-Q:  Aa +1; Ba +1; C -1; Ap 0; Bp 0; D -4.  Genital hiatus 4, perineal body 3, total vaginal length 10-11.     3)  BM:  (--) constipation/straining. Tries not to strain. Was told has redundant colon. Thin caliber.  Just started citrucel  Takes magnesium 500 mg daily, which sometimes causes loose stool. Has SIBO. Has foul-smelling gas.  (--) chronic diarrhea. (--) hematochezia.  (--) fecal incontinence.  (--) fecal smearing/urgency.  (--) complete evacuation.  No splinting. Has bidet to help. No rectal prolapse.   --no major straining/lifting.     PREOPERATIVE DIAGNOSIS:  1. Open fracture of one rib of left side, sequela    2. Nocturia    3. Vaginal atrophy    4. Cystocele, midline    5. Rectocele, female    6. Uterovaginal prolapse    7. Chronic constipation    8. Irregular bowel habits      POSTOPERATIVE DIAGNOSIS:  1. Open fracture of one rib of left side, sequela    2. Nocturia    3. Vaginal atrophy    4. Cystocele, midline    5. Rectocele, female    6. Uterovaginal prolapse    7. Chronic constipation    8. Irregular bowel habits    9.   Concern for voiding dysfunction  10.  Urodynamic stress incontinence    ANESTHESIA:  None.    SPECIMEN (BACTERIOLOGICAL, PATHOLOGICAL OR OTHER):  None.    PROSTHETIC  DEVICE/IMPLANT:  None.    SURGEONS NARRATIVE:  A time out was performed in which the patient identity and procedure were confirmed.  Urodynamic evaluation was performed using a computerized system ([employer]  Uroflowmetry was performed on the patient in the sitting position without catheters in place.  Subsequent urodynamic testing was performed with the patient in the lithotomy position at 45 degrees. Air charged catheters were used with sterile water as the infusion medium. Vesical and abdominal (rectal) pressures were measured, and detrusor pressure was calculated. EMG activity was recorded with surface electrodes. During filling, room temperature sterile water was infused at a rate of 30 cubic centimeters per minute. The patient was asked cough after instillation of each 100cc volume. Two Valsalva leak point pressures and two cough leak point pressures were performed with the catheters in place at 300 cubic centimeters and again at maximum capacity. Valsalva leak point pressure was defined as the difference between vesical pressure at which leakage was noted (visualized at the external urethral meatus) and the baseline vesical pressure. Following urodynamic testing, a pressure flow study was performed with the patient in the sitting position. Vesical and abdominal pressures were monitored and detrusor pressures were calculated. After the pressure flow study, the catheters were then removed. The patient tolerated the procedure well.     Urine dipstick: neg.    1.  VOIDING PHASE:      a.  Uroflowmetry:  Prolapse reduction: No  Voided volume:  47 mL   Voiding time:   21 seconds  Max flow:  13 mL/s  Avg flow:   6 mL/s   PVR:   15 mL    The overall configuration of this uroflow study was with insufficient volume for interpretation.      b.  Pressure flow:  Prolapse reduction: No  Voided volume:   286 mL  Voiding time:   259 seconds  Peak flow:  4 mL/s   Avg flow:  2 mL/s  Max det pressure:  64  cm  H20  Det pressure at max flow: 56 cm H20  Void initiated by detrusor contraction.    Urethral relaxation (EMG):  present.    PVR (calculated):  232 mL.  Patient able to void an additional 200 mL on toilet after removing catheters.    The overall configuration of this pressure flow study was prolonged with concern for voiding dysfunction.      2.  FILLING PHASE:  1st desire: 149 mL  Normal desire:  316 mL  Strong desire:  441 mL  Urgency:  518 mL  Compliance (calculated)  approx 150 mL/cm H20  EMG activity during filling:  unchanged  Detrusor contractions observed: No.      3.  URETHRAL FUNCTION/STORAGE PHASE:    a.  WITH prolapse reduction:  CLPP (150 mL): Positive  at  144 cm H20  VLPP (150 mL): Negative  at  98 cm H20  CLPP (300 mL): Positive  at  136 cm H20  VLPP (300 mL): Negative  at  100 cm H20   CLPP (450 mL): Positive  at  137 cm H20  VLPP (450 mL): Positive  at  123 cm H20  CLPP (MAX ):    Positive  at  158 cm H20  VLPP (MAX):     Positive  at  95 cm H20    These findings are consistent with Positive urodynamic stress incontinence.    Assessment:  UF with insufficient volume for interpretation.   PF prolonged with concern for voiding dysfunction.    Compliance  normal.  Max capacity  518 mL.  DO (--).  JC (+).      Plan:  1)  Rib fracture, L:  --DEXA 10/2024 osteopenia, FRAX not sig elevated     2)  Stage 2 cystocele/rectocele/uterine prolapse:  --surgery scheduled 1-9-2025              --if surgery:  robotic hysterectomy, take tubes (can keep ovaries), sacral colpopexy              --if surgery: pelvic US 10/2024 normal with large fibroid; reports EMBx benign              --if surgery: UDS + JC--add MUS              --if surgery: clearance per PCP ([place]) + labs (CBC, BMP, T&S)/EKG--had recent visit     3)  Vaginal atrophy (dryness):    --Vaginal estrogen:  --Use 0.5 grams of estrogen cream in vagina with applicator or dime-sized amount with finger (as far as can reach internally) nightly x 2 weeks,  then twice a week thereafter.  You can also apply a dime-sized amount with your finger around the vaginal opening and inner lips at same frequency.                           --Vaginal estrogen may help to decrease pain related to dryness with intercourse and urinary symptoms (urgency/frequency/UTIs) around menopause.      4)  Constipation, irregular habits:  --hydrate well  Controlling constipation may help bladder urgency/leakage and fiber may better control cholesterol and blood glucose.  Start daily fiber.  Take 1 tsp of fiber powder.  Mix in 8 oz of water.  Take x 3-5 days.  Then, increase fiber by 1 tsp every 3-5 days until stool is easy to pass, not too hard or too loose.  Stop and continue at that dose.    --continue magnesium  --keep food diary -- avoid triggers  --only take miralax on bad days: you may only need 1/4-1 capful     5)  see preop note for full detail.  Proceed to OR

## 2025-01-08 ENCOUNTER — TELEPHONE (OUTPATIENT)
Dept: UROGYNECOLOGY | Facility: CLINIC | Age: 55
End: 2025-01-08
Payer: COMMERCIAL

## 2025-01-09 ENCOUNTER — HOSPITAL ENCOUNTER (OUTPATIENT)
Facility: OTHER | Age: 55
Discharge: HOME | End: 2025-01-10
Attending: OBSTETRICS & GYNECOLOGY | Admitting: OBSTETRICS & GYNECOLOGY
Payer: COMMERCIAL

## 2025-01-09 ENCOUNTER — ANESTHESIA (OUTPATIENT)
Dept: SURGERY | Facility: OTHER | Age: 55
End: 2025-01-09
Payer: COMMERCIAL

## 2025-01-09 DIAGNOSIS — N81.6 RECTOCELE, FEMALE: ICD-10-CM

## 2025-01-09 DIAGNOSIS — N39.3 URINARY, INCONTINENCE, STRESS FEMALE: ICD-10-CM

## 2025-01-09 DIAGNOSIS — N81.11 CYSTOCELE, MIDLINE: ICD-10-CM

## 2025-01-09 DIAGNOSIS — Z01.818 PREOP TESTING: ICD-10-CM

## 2025-01-09 DIAGNOSIS — Z98.890 S/P ROBOT-ASSISTED SURGICAL PROCEDURE: Primary | ICD-10-CM

## 2025-01-09 DIAGNOSIS — N81.4 UTEROVAGINAL PROLAPSE: ICD-10-CM

## 2025-01-09 LAB
ABO + RH BLD: NORMAL
BLD GP AB SCN CELLS X3 SERPL QL: NORMAL
SPECIMEN OUTDATE: NORMAL

## 2025-01-09 PROCEDURE — 36000713 HC OR TIME LEV V EA ADD 15 MIN: Performed by: OBSTETRICS & GYNECOLOGY

## 2025-01-09 PROCEDURE — 71000039 HC RECOVERY, EACH ADD'L HOUR: Performed by: OBSTETRICS & GYNECOLOGY

## 2025-01-09 PROCEDURE — 57425 LAPAROSCOPY SURG COLPOPEXY: CPT | Mod: AS,51,,

## 2025-01-09 PROCEDURE — 37000009 HC ANESTHESIA EA ADD 15 MINS: Performed by: OBSTETRICS & GYNECOLOGY

## 2025-01-09 PROCEDURE — 88307 TISSUE EXAM BY PATHOLOGIST: CPT | Performed by: PATHOLOGY

## 2025-01-09 PROCEDURE — 63600175 PHARM REV CODE 636 W HCPCS: Mod: TB | Performed by: NURSE ANESTHETIST, CERTIFIED REGISTERED

## 2025-01-09 PROCEDURE — C1781 MESH (IMPLANTABLE): HCPCS | Performed by: OBSTETRICS & GYNECOLOGY

## 2025-01-09 PROCEDURE — 36415 COLL VENOUS BLD VENIPUNCTURE: CPT | Performed by: OBSTETRICS & GYNECOLOGY

## 2025-01-09 PROCEDURE — 57425 LAPAROSCOPY SURG COLPOPEXY: CPT | Mod: 51,,, | Performed by: OBSTETRICS & GYNECOLOGY

## 2025-01-09 PROCEDURE — 63600175 PHARM REV CODE 636 W HCPCS

## 2025-01-09 PROCEDURE — 58573 TLH W/T/O UTERUS OVER 250 G: CPT | Mod: AS,,,

## 2025-01-09 PROCEDURE — 37000008 HC ANESTHESIA 1ST 15 MINUTES: Performed by: OBSTETRICS & GYNECOLOGY

## 2025-01-09 PROCEDURE — 86900 BLOOD TYPING SEROLOGIC ABO: CPT | Performed by: OBSTETRICS & GYNECOLOGY

## 2025-01-09 PROCEDURE — 63600175 PHARM REV CODE 636 W HCPCS: Performed by: ANESTHESIOLOGY

## 2025-01-09 PROCEDURE — C2628 CATHETER, OCCLUSION: HCPCS | Performed by: OBSTETRICS & GYNECOLOGY

## 2025-01-09 PROCEDURE — 36000712 HC OR TIME LEV V 1ST 15 MIN: Performed by: OBSTETRICS & GYNECOLOGY

## 2025-01-09 PROCEDURE — C1765 ADHESION BARRIER: HCPCS | Performed by: OBSTETRICS & GYNECOLOGY

## 2025-01-09 PROCEDURE — 88305 TISSUE EXAM BY PATHOLOGIST: CPT | Mod: 26,,, | Performed by: PATHOLOGY

## 2025-01-09 PROCEDURE — C1771 REP DEV, URINARY, W/SLING: HCPCS | Performed by: OBSTETRICS & GYNECOLOGY

## 2025-01-09 PROCEDURE — 71000033 HC RECOVERY, INTIAL HOUR: Performed by: OBSTETRICS & GYNECOLOGY

## 2025-01-09 PROCEDURE — 94761 N-INVAS EAR/PLS OXIMETRY MLT: CPT

## 2025-01-09 PROCEDURE — 25000003 PHARM REV CODE 250: Performed by: NURSE ANESTHETIST, CERTIFIED REGISTERED

## 2025-01-09 PROCEDURE — 57288 REPAIR BLADDER DEFECT: CPT | Mod: 51,,, | Performed by: OBSTETRICS & GYNECOLOGY

## 2025-01-09 PROCEDURE — 63600175 PHARM REV CODE 636 W HCPCS: Performed by: NURSE ANESTHETIST, CERTIFIED REGISTERED

## 2025-01-09 PROCEDURE — 25000003 PHARM REV CODE 250: Performed by: OBSTETRICS & GYNECOLOGY

## 2025-01-09 PROCEDURE — 94799 UNLISTED PULMONARY SVC/PX: CPT

## 2025-01-09 PROCEDURE — 63600175 PHARM REV CODE 636 W HCPCS: Performed by: OBSTETRICS & GYNECOLOGY

## 2025-01-09 PROCEDURE — 27201423 OPTIME MED/SURG SUP & DEVICES STERILE SUPPLY: Performed by: OBSTETRICS & GYNECOLOGY

## 2025-01-09 PROCEDURE — 57288 REPAIR BLADDER DEFECT: CPT | Mod: AS,51,,

## 2025-01-09 PROCEDURE — 99900035 HC TECH TIME PER 15 MIN (STAT)

## 2025-01-09 PROCEDURE — 82962 GLUCOSE BLOOD TEST: CPT | Performed by: OBSTETRICS & GYNECOLOGY

## 2025-01-09 PROCEDURE — 25000003 PHARM REV CODE 250

## 2025-01-09 PROCEDURE — 25000003 PHARM REV CODE 250: Performed by: ANESTHESIOLOGY

## 2025-01-09 PROCEDURE — 58573 TLH W/T/O UTERUS OVER 250 G: CPT | Mod: ,,, | Performed by: OBSTETRICS & GYNECOLOGY

## 2025-01-09 DEVICE — NONABSORBABLE PROLENE SOFT MESH
Type: IMPLANTABLE DEVICE | Site: PELVIS | Status: FUNCTIONAL
Brand: GYNECARE GYNEMESH

## 2025-01-09 DEVICE — TRANSVAGINAL MID-URETHRAL SLING
Type: IMPLANTABLE DEVICE | Site: PELVIS | Status: FUNCTIONAL
Brand: ADVANTAGE FIT™  SYSTEM

## 2025-01-09 DEVICE — ABSORBABLE ADHESION BARRIER
Type: IMPLANTABLE DEVICE | Site: PELVIS | Status: FUNCTIONAL
Brand: GYNECARE INTERCEED

## 2025-01-09 RX ORDER — ACETAMINOPHEN 500 MG
1000 TABLET ORAL
Status: DISCONTINUED | OUTPATIENT
Start: 2025-01-09 | End: 2025-01-10 | Stop reason: HOSPADM

## 2025-01-09 RX ORDER — MUPIROCIN 20 MG/G
OINTMENT TOPICAL
Status: DISCONTINUED | OUTPATIENT
Start: 2025-01-09 | End: 2025-01-09 | Stop reason: HOSPADM

## 2025-01-09 RX ORDER — ACETAMINOPHEN 500 MG
1000 TABLET ORAL
Status: COMPLETED | OUTPATIENT
Start: 2025-01-09 | End: 2025-01-09

## 2025-01-09 RX ORDER — EPHEDRINE SULFATE 50 MG/ML
INJECTION, SOLUTION INTRAVENOUS
Status: DISCONTINUED | OUTPATIENT
Start: 2025-01-09 | End: 2025-01-09

## 2025-01-09 RX ORDER — SENNOSIDES 8.6 MG/1
8.6 TABLET ORAL 2 TIMES DAILY
Status: DISCONTINUED | OUTPATIENT
Start: 2025-01-09 | End: 2025-01-09

## 2025-01-09 RX ORDER — FAMOTIDINE 20 MG/1
20 TABLET, FILM COATED ORAL
Status: COMPLETED | OUTPATIENT
Start: 2025-01-09 | End: 2025-01-09

## 2025-01-09 RX ORDER — KETOROLAC TROMETHAMINE 30 MG/ML
INJECTION, SOLUTION INTRAMUSCULAR; INTRAVENOUS
Status: DISCONTINUED | OUTPATIENT
Start: 2025-01-09 | End: 2025-01-09

## 2025-01-09 RX ORDER — PROPOFOL 10 MG/ML
VIAL (ML) INTRAVENOUS
Status: DISCONTINUED | OUTPATIENT
Start: 2025-01-09 | End: 2025-01-09

## 2025-01-09 RX ORDER — LIDOCAINE HYDROCHLORIDE AND EPINEPHRINE 10; 10 UG/ML; MG/ML
INJECTION, SOLUTION INFILTRATION; PERINEURAL
Status: DISCONTINUED | OUTPATIENT
Start: 2025-01-09 | End: 2025-01-09 | Stop reason: HOSPADM

## 2025-01-09 RX ORDER — ONDANSETRON HYDROCHLORIDE 2 MG/ML
4 INJECTION, SOLUTION INTRAVENOUS EVERY 12 HOURS PRN
Status: DISCONTINUED | OUTPATIENT
Start: 2025-01-09 | End: 2025-01-10 | Stop reason: HOSPADM

## 2025-01-09 RX ORDER — MEPERIDINE HYDROCHLORIDE 25 MG/ML
12.5 INJECTION INTRAMUSCULAR; INTRAVENOUS; SUBCUTANEOUS ONCE AS NEEDED
Status: DISCONTINUED | OUTPATIENT
Start: 2025-01-09 | End: 2025-01-09 | Stop reason: HOSPADM

## 2025-01-09 RX ORDER — ROCURONIUM BROMIDE 10 MG/ML
INJECTION, SOLUTION INTRAVENOUS
Status: DISCONTINUED | OUTPATIENT
Start: 2025-01-09 | End: 2025-01-09

## 2025-01-09 RX ORDER — LIDOCAINE HYDROCHLORIDE 10 MG/ML
0.5 INJECTION, SOLUTION EPIDURAL; INFILTRATION; INTRACAUDAL; PERINEURAL ONCE
Status: DISCONTINUED | OUTPATIENT
Start: 2025-01-09 | End: 2025-01-09 | Stop reason: HOSPADM

## 2025-01-09 RX ORDER — MIDAZOLAM HYDROCHLORIDE 1 MG/ML
INJECTION INTRAMUSCULAR; INTRAVENOUS
Status: DISCONTINUED | OUTPATIENT
Start: 2025-01-09 | End: 2025-01-09

## 2025-01-09 RX ORDER — DOCUSATE SODIUM 100 MG/1
100 CAPSULE, LIQUID FILLED ORAL 2 TIMES DAILY
Status: DISCONTINUED | OUTPATIENT
Start: 2025-01-09 | End: 2025-01-10 | Stop reason: HOSPADM

## 2025-01-09 RX ORDER — PREGABALIN 75 MG/1
75 CAPSULE ORAL ONCE
Status: COMPLETED | OUTPATIENT
Start: 2025-01-09 | End: 2025-01-09

## 2025-01-09 RX ORDER — GLUCAGON 1 MG
1 KIT INJECTION
Status: DISCONTINUED | OUTPATIENT
Start: 2025-01-09 | End: 2025-01-09 | Stop reason: HOSPADM

## 2025-01-09 RX ORDER — OXYCODONE HYDROCHLORIDE 5 MG/1
5 TABLET ORAL
Status: DISCONTINUED | OUTPATIENT
Start: 2025-01-09 | End: 2025-01-09 | Stop reason: HOSPADM

## 2025-01-09 RX ORDER — ADHESIVE BANDAGE
30 BANDAGE TOPICAL 2 TIMES DAILY
Status: DISCONTINUED | OUTPATIENT
Start: 2025-01-09 | End: 2025-01-09

## 2025-01-09 RX ORDER — PHENYLEPHRINE HYDROCHLORIDE 10 MG/ML
INJECTION INTRAVENOUS
Status: DISCONTINUED | OUTPATIENT
Start: 2025-01-09 | End: 2025-01-09

## 2025-01-09 RX ORDER — KETAMINE HCL IN 0.9 % NACL 50 MG/5 ML
SYRINGE (ML) INTRAVENOUS
Status: DISCONTINUED | OUTPATIENT
Start: 2025-01-09 | End: 2025-01-09

## 2025-01-09 RX ORDER — SODIUM CHLORIDE 0.9 % (FLUSH) 0.9 %
3 SYRINGE (ML) INJECTION
Status: DISCONTINUED | OUTPATIENT
Start: 2025-01-09 | End: 2025-01-09 | Stop reason: HOSPADM

## 2025-01-09 RX ORDER — BUPIVACAINE HYDROCHLORIDE 2.5 MG/ML
INJECTION, SOLUTION EPIDURAL; INFILTRATION; INTRACAUDAL
Status: DISCONTINUED | OUTPATIENT
Start: 2025-01-09 | End: 2025-01-09 | Stop reason: HOSPADM

## 2025-01-09 RX ORDER — ZOLPIDEM TARTRATE 5 MG/1
5 TABLET ORAL NIGHTLY PRN
Status: DISCONTINUED | OUTPATIENT
Start: 2025-01-09 | End: 2025-01-10 | Stop reason: HOSPADM

## 2025-01-09 RX ORDER — HEPARIN SODIUM 5000 [USP'U]/ML
5000 INJECTION, SOLUTION INTRAVENOUS; SUBCUTANEOUS
Status: DISCONTINUED | OUTPATIENT
Start: 2025-01-09 | End: 2025-01-09

## 2025-01-09 RX ORDER — ONDANSETRON HYDROCHLORIDE 2 MG/ML
INJECTION, SOLUTION INTRAVENOUS
Status: DISCONTINUED | OUTPATIENT
Start: 2025-01-09 | End: 2025-01-09

## 2025-01-09 RX ORDER — ACETAMINOPHEN 500 MG
1000 TABLET ORAL EVERY 6 HOURS PRN
Status: DISCONTINUED | OUTPATIENT
Start: 2025-01-09 | End: 2025-01-09

## 2025-01-09 RX ORDER — DEXAMETHASONE SODIUM PHOSPHATE 4 MG/ML
INJECTION, SOLUTION INTRA-ARTICULAR; INTRALESIONAL; INTRAMUSCULAR; INTRAVENOUS; SOFT TISSUE
Status: DISCONTINUED | OUTPATIENT
Start: 2025-01-09 | End: 2025-01-09

## 2025-01-09 RX ORDER — CEFAZOLIN 2 G/1
2 INJECTION, POWDER, FOR SOLUTION INTRAMUSCULAR; INTRAVENOUS
Status: DISCONTINUED | OUTPATIENT
Start: 2025-01-09 | End: 2025-01-09 | Stop reason: HOSPADM

## 2025-01-09 RX ORDER — HEPARIN SODIUM 5000 [USP'U]/ML
5000 INJECTION, SOLUTION INTRAVENOUS; SUBCUTANEOUS EVERY 8 HOURS
Status: DISCONTINUED | OUTPATIENT
Start: 2025-01-09 | End: 2025-01-09 | Stop reason: HOSPADM

## 2025-01-09 RX ORDER — SODIUM CHLORIDE, SODIUM LACTATE, POTASSIUM CHLORIDE, CALCIUM CHLORIDE 600; 310; 30; 20 MG/100ML; MG/100ML; MG/100ML; MG/100ML
INJECTION, SOLUTION INTRAVENOUS CONTINUOUS
Status: DISCONTINUED | OUTPATIENT
Start: 2025-01-09 | End: 2025-01-10

## 2025-01-09 RX ORDER — FENTANYL CITRATE 50 UG/ML
INJECTION, SOLUTION INTRAMUSCULAR; INTRAVENOUS
Status: DISCONTINUED | OUTPATIENT
Start: 2025-01-09 | End: 2025-01-09

## 2025-01-09 RX ORDER — LIDOCAINE HYDROCHLORIDE 20 MG/ML
INJECTION INTRAVENOUS
Status: DISCONTINUED | OUTPATIENT
Start: 2025-01-09 | End: 2025-01-09

## 2025-01-09 RX ORDER — OXYCODONE HYDROCHLORIDE 5 MG/1
5 TABLET ORAL EVERY 4 HOURS PRN
Status: DISCONTINUED | OUTPATIENT
Start: 2025-01-09 | End: 2025-01-10 | Stop reason: HOSPADM

## 2025-01-09 RX ORDER — IBUPROFEN 600 MG/1
600 TABLET ORAL EVERY 6 HOURS PRN
Status: DISCONTINUED | OUTPATIENT
Start: 2025-01-09 | End: 2025-01-09

## 2025-01-09 RX ORDER — HYDROMORPHONE HYDROCHLORIDE 2 MG/ML
INJECTION, SOLUTION INTRAMUSCULAR; INTRAVENOUS; SUBCUTANEOUS
Status: DISCONTINUED | OUTPATIENT
Start: 2025-01-09 | End: 2025-01-09

## 2025-01-09 RX ORDER — HYDROMORPHONE HYDROCHLORIDE 2 MG/ML
0.4 INJECTION, SOLUTION INTRAMUSCULAR; INTRAVENOUS; SUBCUTANEOUS EVERY 5 MIN PRN
Status: DISCONTINUED | OUTPATIENT
Start: 2025-01-09 | End: 2025-01-09 | Stop reason: HOSPADM

## 2025-01-09 RX ORDER — VECURONIUM BROMIDE 1 MG/ML
INJECTION, POWDER, LYOPHILIZED, FOR SOLUTION INTRAVENOUS
Status: DISCONTINUED | OUTPATIENT
Start: 2025-01-09 | End: 2025-01-09

## 2025-01-09 RX ORDER — NALOXONE HCL 0.4 MG/ML
0.02 VIAL (ML) INJECTION
Status: DISCONTINUED | OUTPATIENT
Start: 2025-01-09 | End: 2025-01-10 | Stop reason: HOSPADM

## 2025-01-09 RX ORDER — PROCHLORPERAZINE EDISYLATE 5 MG/ML
5 INJECTION INTRAMUSCULAR; INTRAVENOUS EVERY 30 MIN PRN
Status: DISCONTINUED | OUTPATIENT
Start: 2025-01-09 | End: 2025-01-09 | Stop reason: HOSPADM

## 2025-01-09 RX ORDER — IBUPROFEN 600 MG/1
600 TABLET ORAL EVERY 6 HOURS
Status: DISCONTINUED | OUTPATIENT
Start: 2025-01-09 | End: 2025-01-10 | Stop reason: HOSPADM

## 2025-01-09 RX ORDER — PROPOFOL 10 MG/ML
VIAL (ML) INTRAVENOUS CONTINUOUS PRN
Status: DISCONTINUED | OUTPATIENT
Start: 2025-01-09 | End: 2025-01-09

## 2025-01-09 RX ADMIN — ROCURONIUM BROMIDE 50 MG: 10 SOLUTION INTRAVENOUS at 10:01

## 2025-01-09 RX ADMIN — SODIUM CHLORIDE: 0.9 INJECTION, SOLUTION INTRAVENOUS at 11:01

## 2025-01-09 RX ADMIN — SODIUM CHLORIDE, SODIUM LACTATE, POTASSIUM CHLORIDE, AND CALCIUM CHLORIDE: 600; 310; 30; 20 INJECTION, SOLUTION INTRAVENOUS at 08:01

## 2025-01-09 RX ADMIN — HYDROMORPHONE HYDROCHLORIDE 0.4 MG: 2 INJECTION INTRAMUSCULAR; INTRAVENOUS; SUBCUTANEOUS at 02:01

## 2025-01-09 RX ADMIN — ACETAMINOPHEN 1000 MG: 500 TABLET, FILM COATED ORAL at 09:01

## 2025-01-09 RX ADMIN — IBUPROFEN 600 MG: 600 TABLET, FILM COATED ORAL at 05:01

## 2025-01-09 RX ADMIN — SUGAMMADEX 200 MG: 100 INJECTION, SOLUTION INTRAVENOUS at 03:01

## 2025-01-09 RX ADMIN — HYDROMORPHONE HYDROCHLORIDE 0.4 MG: 2 INJECTION INTRAMUSCULAR; INTRAVENOUS; SUBCUTANEOUS at 03:01

## 2025-01-09 RX ADMIN — PREGABALIN 75 MG: 75 CAPSULE ORAL at 09:01

## 2025-01-09 RX ADMIN — DOCUSATE SODIUM 100 MG: 100 CAPSULE, LIQUID FILLED ORAL at 08:01

## 2025-01-09 RX ADMIN — PHENYLEPHRINE HYDROCHLORIDE 100 MCG: 10 INJECTION INTRAVENOUS at 10:01

## 2025-01-09 RX ADMIN — MIDAZOLAM HYDROCHLORIDE 2 MG: 1 INJECTION, SOLUTION INTRAMUSCULAR; INTRAVENOUS at 09:01

## 2025-01-09 RX ADMIN — DEXAMETHASONE SODIUM PHOSPHATE 4 MG: 4 INJECTION, SOLUTION INTRAMUSCULAR; INTRAVENOUS at 10:01

## 2025-01-09 RX ADMIN — Medication 10 MG: at 01:01

## 2025-01-09 RX ADMIN — CEFAZOLIN 2 G: 2 INJECTION, POWDER, FOR SOLUTION INTRAMUSCULAR; INTRAVENOUS at 02:01

## 2025-01-09 RX ADMIN — PROPOFOL 150 MG: 10 INJECTION, EMULSION INTRAVENOUS at 10:01

## 2025-01-09 RX ADMIN — VECURONIUM BROMIDE FOR INJECTION 2 MG: 1 INJECTION, POWDER, LYOPHILIZED, FOR SOLUTION INTRAVENOUS at 01:01

## 2025-01-09 RX ADMIN — EPHEDRINE SULFATE 10 MG: 50 INJECTION INTRAVENOUS at 11:01

## 2025-01-09 RX ADMIN — GLYCOPYRROLATE 0.2 MG: 0.2 INJECTION INTRAMUSCULAR; INTRAVENOUS at 10:01

## 2025-01-09 RX ADMIN — ACETAMINOPHEN 1000 MG: 500 TABLET, FILM COATED ORAL at 08:01

## 2025-01-09 RX ADMIN — PROPOFOL 50 MCG/KG/MIN: 10 INJECTION, EMULSION INTRAVENOUS at 10:01

## 2025-01-09 RX ADMIN — VECURONIUM BROMIDE FOR INJECTION 4 MG: 1 INJECTION, POWDER, LYOPHILIZED, FOR SOLUTION INTRAVENOUS at 12:01

## 2025-01-09 RX ADMIN — VECURONIUM BROMIDE FOR INJECTION 3 MG: 1 INJECTION, POWDER, LYOPHILIZED, FOR SOLUTION INTRAVENOUS at 02:01

## 2025-01-09 RX ADMIN — OXYCODONE HYDROCHLORIDE 5 MG: 5 TABLET ORAL at 08:01

## 2025-01-09 RX ADMIN — ONDANSETRON HYDROCHLORIDE 4 MG: 2 INJECTION INTRAMUSCULAR; INTRAVENOUS at 03:01

## 2025-01-09 RX ADMIN — FAMOTIDINE 20 MG: 20 TABLET, FILM COATED ORAL at 09:01

## 2025-01-09 RX ADMIN — FENTANYL CITRATE 100 MCG: 50 INJECTION, SOLUTION INTRAMUSCULAR; INTRAVENOUS at 10:01

## 2025-01-09 RX ADMIN — CEFAZOLIN 2 G: 2 INJECTION, POWDER, FOR SOLUTION INTRAMUSCULAR; INTRAVENOUS at 10:01

## 2025-01-09 RX ADMIN — KETOROLAC TROMETHAMINE 30 MG: 30 INJECTION, SOLUTION INTRAMUSCULAR; INTRAVENOUS at 03:01

## 2025-01-09 RX ADMIN — VECURONIUM BROMIDE FOR INJECTION 3 MG: 1 INJECTION, POWDER, LYOPHILIZED, FOR SOLUTION INTRAVENOUS at 10:01

## 2025-01-09 RX ADMIN — LIDOCAINE HYDROCHLORIDE 50 MG: 20 INJECTION, SOLUTION INTRAVENOUS at 10:01

## 2025-01-09 RX ADMIN — OXYCODONE 5 MG: 5 TABLET ORAL at 03:01

## 2025-01-09 RX ADMIN — SODIUM CHLORIDE: 0.9 INJECTION, SOLUTION INTRAVENOUS at 02:01

## 2025-01-09 RX ADMIN — Medication 20 MG: at 10:01

## 2025-01-09 RX ADMIN — SODIUM CHLORIDE: 0.9 INJECTION, SOLUTION INTRAVENOUS at 10:01

## 2025-01-09 RX ADMIN — ACETAMINOPHEN 1000 MG: 500 TABLET, FILM COATED ORAL at 05:01

## 2025-01-09 RX ADMIN — VECURONIUM BROMIDE FOR INJECTION 3 MG: 1 INJECTION, POWDER, LYOPHILIZED, FOR SOLUTION INTRAVENOUS at 12:01

## 2025-01-09 RX ADMIN — MUPIROCIN: 20 OINTMENT TOPICAL at 09:01

## 2025-01-09 RX ADMIN — HEPARIN SODIUM 5000 UNITS: 5000 INJECTION INTRAVENOUS; SUBCUTANEOUS at 09:01

## 2025-01-09 RX ADMIN — VECURONIUM BROMIDE FOR INJECTION 3 MG: 1 INJECTION, POWDER, LYOPHILIZED, FOR SOLUTION INTRAVENOUS at 11:01

## 2025-01-09 NOTE — ANESTHESIA POSTPROCEDURE EVALUATION
Anesthesia Post Evaluation    Patient: Ashley Whitley    Procedure(s) Performed: Procedure(s) (LRB):  ROBOTIC HYSTERECTOMY,WITH SALPINGECTOMY (Bilateral)  XI ROBOTIC SACROCOLPOPEXY, ABDOMINAL (N/A)  SLING, MIDURETHRAL (N/A)  CYSTOSCOPY (N/A)    Final Anesthesia Type: general      Patient location during evaluation: PACU  Patient participation: Yes- Able to Participate  Level of consciousness: awake and alert  Post-procedure vital signs: reviewed and stable  Pain management: adequate  Airway patency: patent    PONV status at discharge: No PONV  Anesthetic complications: no      Cardiovascular status: blood pressure returned to baseline  Respiratory status: spontaneous ventilation  Hydration status: euvolemic  Follow-up not needed.          Vitals Value Taken Time   /74 01/09/25 1631   Temp 36.7 °C (98 °F) 01/09/25 1630   Pulse 85 01/09/25 1642   Resp 16 01/09/25 1630   SpO2 98 % 01/09/25 1642   Vitals shown include unfiled device data.      Event Time   Out of Recovery 16:43:28         Pain/Racheal Score: Pain Rating Prior to Med Admin: 6 (1/9/2025  3:52 PM)  Pain Rating Post Med Admin: 4 (1/9/2025  4:15 PM)  Racheal Score: 9 (1/9/2025  4:30 PM)

## 2025-01-09 NOTE — NURSING
Received patient to room 367 from Recovery. Pt AAO x 4.O2@2L/NC.  Resp. Even and unlabored. Pt states pain level is @ 5 within comfort range @ this time. Lap sites x 5 with steris and bandaids CDI. F/C patent and draining clear yellow urine. Family @ bedside. Safety maintained. Call light in reach. Vital signs stable.

## 2025-01-09 NOTE — OP NOTE
Date of Operation: 01/09/2025    Title of Operation:  1)  Robotic-assisted total laparoscopic hysterectomy with bilateral salpingectomy  2)  Robotic-assisted laparoscopic sacral colpopexy with polypropylene mesh  3)  Placement of retropubic tension-free midurethral sling, Advantage Fit ([employer])  4) Cystourethroscopy    Indications for Surgery:  1)  UI:  (--) JC.  (--) UUI.  Has been doing pelvic floor PT.  No UI prior to this.  (--) pads. Daytime frequency: Q 3 hours.  Nocturia: Yes: 0-1/night.   (--) dysuria,  (--) hematuria,  (--) frequent UTIs.  (+) complete bladder emptying.     2)  POP:  Present since March.  Past introitus.  Symptoms:(+)  notices on wiping, bothered by it.  (--) vaginal bleeding. (--) vaginal discharge. (+) sexually active.  (--) dyspareunia.  (+)  Vaginal dryness.  (--) vaginal estrogen use.   --POP-Q:  Aa +1; Ba +1; C -1; Ap 0; Bp 0; D -4.  Genital hiatus 4, perineal body 3, total vaginal length 10-11.      3)  BM:  (--) constipation/straining. Tries not to strain. Was told has redundant colon. Thin caliber.  Just started citrucel  Takes magnesium 500 mg daily, which sometimes causes loose stool. Has SIBO. Has foul-smelling gas.  (--) chronic diarrhea. (--) hematochezia.  (--) fecal incontinence.  (--) fecal smearing/urgency.  (--) complete evacuation.  No splinting. Has bidet to help. No rectal prolapse.   --no major straining/lifting.     Preoperative Diagnosis:  1. Open fracture of one rib of left side, sequela    2. Nocturia    3. Vaginal atrophy    4. Cystocele, midline    5. Rectocele, female    6. Uterovaginal prolapse    7. Chronic constipation    8. Irregular bowel habits    9.   Concern for voiding dysfunction  10.  Urodynamic stress incontinence    Postoperative Diagnosis:  1. Open fracture of one rib of left side, sequela    2. Nocturia    3. Vaginal atrophy    4. Cystocele, midline    5. Rectocele, female    6. Uterovaginal prolapse    7. Chronic constipation    8.  Irregular bowel habits    9.   Concern for voiding dysfunction  10.  Urodynamic stress incontinence    Anesthesia:  General endotracheal anesthesia.  Additionally, 0.25% marcaine was injected prior to placement of robotic trocars, and  a dilute solution of 1% lidocaine with epinephrine was injected vaginally for local anesthesia.    Specimen (Bacteriological, Pathological or other):  Uterus with large fibroid, bilateral fallopian tubes    Vaginal Packing (type and #):   none    Prosthetic Device/Implant:  1)  Gynecare gynemesh (10 x 15 cm).  LOT# [id number].    2)  Advantage Fit sling.  LOT# [id number].    3)  Interceed barrier.  LOT# [id number].      Surgeons Narrative:    Surgeon: Michael Borrego MD    Assistants:  Arely Jean Baptiste MD (PGY3).  PETE Pickard (insufficient resident assistance).      Intravenous Fluids:  2000 mL     Estimated Blood Loss:  200 mL     Urine Output:  100 mL     Counts:  Sponge, lap, needle counts correct x 2.     Drains: Conrad catheter.     Disposition:  The patient was sent to the PACU in stable condition.     Findings:     1.  On exam under anesthesia,  normal external female genitalia. There was prolapse as previously noted in clinic.  Uterus and cervix: Enlarged 10-12 weeks' size, globular.  Adnexa: non palpable.     2.  On laparoscopic survey:    --The bowel was grossly Normal.    --The appendix was not visualized.   --The uterus and cervix were present and Normal. The uterus was enlarged to 10-12 weeks' size and globular, comprised mostly of a large fibroid.  Bilateral fallopian tubes and ovaries were grossly normal.   --The liver margin Normal.   --The gall bladder was present and Normal.   --Bilateral ureters were visualized and noted to vermiculate at the start and close of the case.    --Adhesions were present from the anterior uterus to the anterior abdominal wall and from the bladder to the lower uterine segment.    --Other findings included:  none.       3.  On cystoscopy, the  bladder mucosa was Normal.  After RATLH/BS/ASC/sling, there was no suture or mesh within the bladder mucosa.  The ureteral orifices were visualized bilaterally with (+) noted good efflux x 2. On a systematic survey of the bladder dome to the base of the urethrovesical junction, there were not other abnormalities noted. The urethra was normal on retraction of the scope.     4.  Rectal exam:  At the close of the case, rectal exam was performed.  There was no suture, mesh, or other injury noted on exam.  No obvious masses were palpated.     Description of procedure:    The patient was identified in the preoperative area where informed consent was confirmed, and she was taken to the operating room where an adequate level of general anesthesia was obtained.  The patient was positioned in lithotomy position with legs in Jagdeep stirrups. Care was taken to avoid joint hyperflexion or hyperextension, and all extremity surfaces were carefully padded so as to minimize risk of neurologic injury. Intravenous antibiotics were administered preoperatively. Sequential compression devices were applied to the patient's lower extremities preoperatively and heparin was administered subcutaneously for VTE prophylaxis.  Surgical time-out was performed, where the patient was identified and procedures confirmed.  An examination under anesthesia was performed with findings described as above.  The patient's abdomen, perineum, and vagina were sterilely prepped and draped. A gay catheter was placed in the bladder for drainage.      A weighted speculum was placed in the vagina.  The cervix was identified, the posterior lip was grasped with a single toothed tenaculum, and stay sutures of 0-vicryl were placed in the anterior and posterior cervix.  The uterus was sounded, and the appropriate uterine manipulator and KOH colpotomizer were selected.  The JOSE EDUARDO/KOH apparatus was assembled, and the uterine manipulator was advanced into the uterine  cavity until the KOH colpotomizer was secured optimally around the cervix.  The uterine manipulator was inflated to secure the device, and a vaginal occluder balloon was placed distally to the JOSE EDUARDO/KOH and inflated until the vaginal cavity was occluded.      First, we placed laparoscopic ports. Before placement of each laparoscopic port, several mL of 0.25% Marcaine were injected subcutaneously as well as deeply into the tissue of each site.  First, a supraumbilical incision of 5-mm was made, and the 5 mm trocar was inserted into the incision until peritoneal entry was gained and confirmed. Carbon dioxide was insufflated through the port until an adequate pneumoperitoneum of no greater than 15 mm Hg was obtained.  The laparoscopic camera was inserted through this port for visualization of all subsequent port placement.  Two 8 mm ports were place on bilaterally to the supraumbilical port, each approximately 8-10 cm lateral, along the mid clavicular line at the level of the umbilicus, at least 2 cm cephalad and medial of the anterior superior iliac spine. Each 8 mm trocar was inserted under direct visualization without significant trauma.   An 8 mm airseal port was placed in the right upper quadrant, superior to the umbilicus and midway between the right lower quadrant port and the umbilicus in a triangulated fashion. A third 8 mm port was placed at the left side, midway between the left lower quadrant port and the umbilicus.  The 5 mm umbilical camera port was then exchanged for a 8 mm robotic camera port.  There were no complications with these port placements. A total of 5 ports had been placed, including the supraumbilical port for the camera. The robot was then docked.      We began the case with total laparoscopic hysterectomy.  The ureters were visualized bilaterally.  Decision had previously been made to remove bilateral tubes but retain ovaries.  Sequentially, the right mesosalpinx, utero-ovarian, round, and  broad ligaments were electrodessicated with bipolar cautery.  The anterior and posterior sheaths of the round ligament were gently , the uterine vessels were exposed, and electrodessicated.  Using sharp dissection, the vesicouterine fold was created, and the bladder was carefully dissected off the anterior vagina.  Of note the bladder was scarred to the lower uterine segment.   Next, these same steps were repeated along the patient's left side.  Excellent hemostasis was noted.  Using the monopolar sarah, the anterior colpotomy was created along the KOH device and considered circumferentially until the entire uterine specimen was freed from the pelvis. Due to the large size of the uterine fundus, it was necessary to remove the large fibroid, taking up most of the uterine volume and bivalve the remaining uterus.  The uterus and cervix were then delivered from the colpotomy, followed by the removed fibroid.  The specimen was then handed to pathology for further evaluation.  The vaginal cuff was closed with several figure-of-eight stitches of 0-vicryl.  Excellent hemostasis was noted.     We then proceeded with sacral colpopexy. We gently retracted the sigmoid colon to the patient's left so that we could easily visualize the sacral promontory.  The peritoneum was opened over the sacral  promontory, and the presacral space was developed.  Care was used to avoid any trauma to the vasculature or nerve supply underlying this space during this process, and excellent hemostasis was noted throughout. Peritoneal dissection was continued over the sacral promontory to provide adequate space for attachment of the GyneMesh. This process was aided with blunt dissection using Kittners. The peritoneum was then opened cadually all the way to the vaginal cuff.  With the EEA sizers in the vagina and in the rectum, the rectovaginal space was demonstrated. The peritoneum overlying this spaced was opened and the space bluntly  dissected down to approximately the distal 1/3 of the vagina. Hemostasis was maintained with electrocautery. After retrograde filling the bladder to demonstrate the most cephalad portion of the bladder, the bladder was dissected off the vaginal cuff and anterior vagina.  Again, excellent hemostasis was noted.    Two pieces of Gynemesh each about 4 cm in width x 15 cm in length were cut. These were broadly affixed to the vagina anteriorly and posteriorly with several rows of interrupted sutures of 2-0 PDS, herminia 6 sutures on the posterior vagina and 6 sutures on the anterior vagina. With the EEA sizer deviating the vagina to the right pararectal space, the pieces of mesh were tensioned appropriately and sutured to the anterior ligament overlying the presacral space at herminia the level of S1 and S2 using 2 x 0-Ethibond sutures. Care was taken to make sure that the mesh was not placed under tension. Excellent hemostasis was noted during this process.  Care was used to avoid trauma to the presacral vasculature during this step. Excess mesh was removed. Interrupted stitches of 0-Vicryl was used to close the peritoneum over the sacral promontory and along the entire extent of the GyneMesh to the vaginal cuff.  Excellent hemostasis was noted at the end of the procedure. All needles and suture pieces were removed from the pelvis laparoscopically throughout the procedure.  Needles, sponge, and lap counts were correct x 2 at the end of the procedure. At the end of the robotic portion of the case, another pelvic survey was completed.  The pelvis was irrigated, all irrigants removed. All operative areas were noted to be hemostatic.      Next, cystourethroscopy was performed.  Findings were as noted above.   There were  no other abnormalities noted.  Ureteral orifices were noted to have good efflux bilaterally.  The bladder was drained, and a Conrad catheter was replaced.    At this point, the robotic/laparoscopic portion of the  procedure was completed. The pelvis was irrigated, and all irrigants were removed. Hemoblast was placed along all pedicles and planes of dissection to encourage continued hemostasis.  Interceed was placed along all suture lines and planes of dissection to discourage future adhesion formation.  This was done under direct laparoscopic visualization. The abdomen was deflated and all laparoscopic sleeves and other instruments were removed from the abdomen. All laparoscopic skin incisions were closed with subcuticular stitches of 4-0 Monocryl and secured with steri strips and band-aids.  Excellent cosmesis and hemostasis was noted.             We then turned attention vaginally.  Excellent support of all vaginal walls was noted, and no further repairs were needed.  Therefore, we proceeded with placement of the Advantage Fit midurethral sling. The skin was marked just above the symphysis pubis, 2 cm laterally on either side of the midline indicating the exit sites for the trocars.  The Conrad catheter was palpated at the urethrovesical junction, and 2 Allis clamps were placed at the anterior vaginal wall along the midurethra. The Conrad catheter was removed from the patient's bladder. The vaginal epithelium between the two Allis clamps was injected with the 1% lidocaine with epinephrine.  Metzenbaum scissors were used to dissect the vaginal epithelium off the underlying pubocervical muscular tissue in the direction of the angle formed between the ischiopubic ramus and the pubic bone bilaterally. The rigid catheter guide was used to deviate the bladder neck and urethra to the patient's left while the right trocar was advanced to the dissected tract in the patient's right side.  Once the urogenital membrane was perforated, the trocar and handle were reoriented in the sagittal plane and the tip of the trocar was advanced through the retropubic space in intimate proximity to the pubic bone.  The trocar was then advanced through  the skin approximately 2 cm to the right of the midline just above the pubic symphysis. The passage of the Advantage Fit trocar was repeated on the patient's left hand side in a similar fashion, using the catheter guide to deviate the bladder neck to the right.  At this point, cystourethroscopy was performed. Cystoscopy was negative for injury after infusion of at least 300 mL in the bladder.  The ureteral orifices were noted to have good efflux bilaterally.  The bladder was then drained. With a #10 Hegar dilator placed between the sling mesh and the urethra, the arms of the sling were elevated above the pubic symphysis and the plastic sheaths were removed from the mesh arms.  Excess mesh was trimmed beneath the suprapubic skin.  The Hegar dilator ensured that the mesh sling was placed in a tension-free manner.  The vaginal mucosa overlying the sling mesh was closed with a several mattress stitches of 2-0 Vicryl with excellent hemostasis noted.  The suprapubic incisions were closed with dermabond.    At the close of the case, all counts were correct x2.  The vagina was irrigated, and all irrigants were removed.  The Conrad was in place and draining well.  The patient was awakened from general endotracheal anesthesia and was taken to the Recovery Room in stable condition.  She tolerated the procedure well.    I was present and scrubbed for the entire procedure.

## 2025-01-09 NOTE — OPERATIVE NOTE ADDENDUM
Certification of Assistant at Surgery       Surgery Date: 1/9/2025     Participating Surgeons:  Surgeons and Role:     * Michael Borrego MD - Primary     * Arely Jean Baptiste MD - Resident - Assisting    Procedures:  Procedure(s) (LRB):  ROBOTIC HYSTERECTOMY,WITH SALPINGECTOMY (N/A)  XI ROBOTIC SACROCOLPOPEXY, ABDOMINAL (N/A)  SLING, MIDURETHRAL (N/A)  CYSTOSCOPY, WITH PERIURETHRAL BULKING AGENT INJECTION (N/A)    Assistant Surgeon's Certification of Necessity:  I understand that section 1842 (b) (6) (d) of the Social Security Act generally prohibits Medicare Part B reasonable charge payment for the services of assistants at surgery in teaching hospitals when qualified residents are available to furnish such services. I certify that the services for which payment is claimed were medically necessary, and that no qualified resident was available to perform the services. I further understand that these services are subject to post-payment review by the Medicare carrier.      Diana Castañeda PA-C    01/09/2025  2:26 PM

## 2025-01-09 NOTE — INTERVAL H&P NOTE
The patient has been examined and the H&P has been reviewed:    I concur with the findings and no changes have occurred since H&P was written.    Anesthesia risks, benefits and alternative options discussed and understood by patient/family.          Active Hospital Problems    Diagnosis  POA    Uterovaginal prolapse [N81.4]  Yes      Resolved Hospital Problems   No resolved problems to display.

## 2025-01-09 NOTE — OR NURSING
Racheal score of 9, patient is alert, reports tolerable 4/10 pain, maintaining SPO2 on2L via nasal cannula. Patient has met criteria and is ready for transfer.     Report called to nurse Chairez who will be assuming care of the patient.     Family updated and given room information.

## 2025-01-09 NOTE — INTERVAL H&P NOTE
The patient has been examined and the H&P has been reviewed:    I concur with the findings and no changes have occurred since H&P was written.    Surgery risks, benefits and alternative options discussed and understood by patient/family.    Surgical consents updated and signed.       There are no hospital problems to display for this patient.

## 2025-01-09 NOTE — ANESTHESIA PROCEDURE NOTES
Intubation    Date/Time: 1/9/2025 10:08 AM    Performed by: Cris Metz CRNA  Authorized by: Ayan Martel MD    Intubation:     Induction:  Intravenous    Intubated:  Postinduction    Mask Ventilation:  Easy mask    Attempts:  1    Attempted By:  Staff anesthesiologist    Method of Intubation:  Video laryngoscopy    Blade:  Stone 3    Laryngeal View Grade: Grade I - full view of cords      Difficult Airway Encountered?: No      Complications:  None    Airway Device:  Oral endotracheal tube    Airway Device Size:  7.0    Style/Cuff Inflation:  Cuffed (inflated to minimal occlusive pressure)    Tube secured:  22    Secured at:  The lips    Placement Verified By:  Capnometry    Complicating Factors:  None    Findings Post-Intubation:  BS equal bilateral and atraumatic/condition of teeth unchanged

## 2025-01-09 NOTE — TRANSFER OF CARE
"Anesthesia Transfer of Care Note    Patient: Ashley Whitley    Procedure(s) Performed: Procedure(s) (LRB):  ROBOTIC HYSTERECTOMY,WITH SALPINGECTOMY (N/A)  XI ROBOTIC SACROCOLPOPEXY, ABDOMINAL (N/A)  SLING, MIDURETHRAL (N/A)  CYSTOSCOPY, WITH PERIURETHRAL BULKING AGENT INJECTION (N/A)    Patient location: PACU    Anesthesia Type: general    Transport from OR: Transported from OR on 6-10 L/min O2 by face mask with adequate spontaneous ventilation    Post pain: adequate analgesia    Post assessment: no apparent anesthetic complications and tolerated procedure well    Post vital signs: stable    Level of consciousness: awake, alert and oriented    Nausea/Vomiting: no nausea/vomiting    Complications: none    Transfer of care protocol was followed      Last vitals: Visit Vitals  /77 (BP Location: Right arm, Patient Position: Sitting)   Pulse 75   Temp 36.7 °C (98 °F) (Oral)   Resp 16   Ht 5' 4" (1.626 m)   Wt 58.5 kg (129 lb)   SpO2 99%   Breastfeeding No   BMI 22.14 kg/m²     "

## 2025-01-09 NOTE — CARE UPDATE
"Afternoon Assessment:    Patient s/p ROBOTIC HYSTERECTOMY,WITH SALPINGECTOMY (N/A), XI ROBOTIC SACROCOLPOPEXY, ABDOMINAL (N/A), SLING, MIDURETHRAL (N/A), and CYSTOSCOPY.    Temp:  [97.1 °F (36.2 °C)-98 °F (36.7 °C)] 97.9 °F (36.6 °C)  Pulse:  [75-91] 76  Resp:  [16] 16  SpO2:  [95 %-99 %] 95 %  BP: (116-128)/(59-77) 116/63    Labs:  No results for input(s): "WBC", "RBC", "HGB", "HCT", "PLT", "MCV", "MCH", "MCHC" in the last 24 hours.    A/P:  Remain inpatient for post op recovery.  AM CBC and BMP    Arely Jean Baptiste MD PGY-3  Obstetrics and Gynecology    "

## 2025-01-10 VITALS
HEIGHT: 64 IN | OXYGEN SATURATION: 97 % | SYSTOLIC BLOOD PRESSURE: 99 MMHG | BODY MASS INDEX: 22.02 KG/M2 | HEART RATE: 70 BPM | WEIGHT: 129 LBS | TEMPERATURE: 99 F | DIASTOLIC BLOOD PRESSURE: 57 MMHG | RESPIRATION RATE: 14 BRPM

## 2025-01-10 LAB
ANION GAP SERPL CALC-SCNC: 9 MMOL/L (ref 8–16)
BASOPHILS # BLD AUTO: 0.02 K/UL (ref 0–0.2)
BASOPHILS NFR BLD: 0.3 % (ref 0–1.9)
BUN SERPL-MCNC: 7 MG/DL (ref 6–20)
CALCIUM SERPL-MCNC: 8.2 MG/DL (ref 8.7–10.5)
CHLORIDE SERPL-SCNC: 111 MMOL/L (ref 95–110)
CO2 SERPL-SCNC: 19 MMOL/L (ref 23–29)
CREAT SERPL-MCNC: 0.7 MG/DL (ref 0.5–1.4)
DIFFERENTIAL METHOD BLD: ABNORMAL
EOSINOPHIL # BLD AUTO: 0.1 K/UL (ref 0–0.5)
EOSINOPHIL NFR BLD: 0.6 % (ref 0–8)
ERYTHROCYTE [DISTWIDTH] IN BLOOD BY AUTOMATED COUNT: 13.4 % (ref 11.5–14.5)
EST. GFR  (NO RACE VARIABLE): >60 ML/MIN/1.73 M^2
GLUCOSE SERPL-MCNC: 90 MG/DL (ref 70–110)
HCT VFR BLD AUTO: 32.5 % (ref 37–48.5)
HGB BLD-MCNC: 10.5 G/DL (ref 12–16)
IMM GRANULOCYTES # BLD AUTO: 0.03 K/UL (ref 0–0.04)
IMM GRANULOCYTES NFR BLD AUTO: 0.4 % (ref 0–0.5)
LYMPHOCYTES # BLD AUTO: 1.8 K/UL (ref 1–4.8)
LYMPHOCYTES NFR BLD: 22.5 % (ref 18–48)
MCH RBC QN AUTO: 31.4 PG (ref 27–31)
MCHC RBC AUTO-ENTMCNC: 32.3 G/DL (ref 32–36)
MCV RBC AUTO: 97 FL (ref 82–98)
MONOCYTES # BLD AUTO: 0.6 K/UL (ref 0.3–1)
MONOCYTES NFR BLD: 7.8 % (ref 4–15)
NEUTROPHILS # BLD AUTO: 5.4 K/UL (ref 1.8–7.7)
NEUTROPHILS NFR BLD: 68.4 % (ref 38–73)
NRBC BLD-RTO: 0 /100 WBC
PLATELET # BLD AUTO: 261 K/UL (ref 150–450)
PMV BLD AUTO: 8.5 FL (ref 9.2–12.9)
POCT GLUCOSE: 92 MG/DL (ref 70–110)
POTASSIUM SERPL-SCNC: 3.7 MMOL/L (ref 3.5–5.1)
RBC # BLD AUTO: 3.34 M/UL (ref 4–5.4)
SODIUM SERPL-SCNC: 139 MMOL/L (ref 136–145)
WBC # BLD AUTO: 7.83 K/UL (ref 3.9–12.7)

## 2025-01-10 PROCEDURE — 80048 BASIC METABOLIC PNL TOTAL CA: CPT

## 2025-01-10 PROCEDURE — 25000003 PHARM REV CODE 250

## 2025-01-10 PROCEDURE — 36415 COLL VENOUS BLD VENIPUNCTURE: CPT

## 2025-01-10 PROCEDURE — 85025 COMPLETE CBC W/AUTO DIFF WBC: CPT

## 2025-01-10 RX ORDER — OXYCODONE HYDROCHLORIDE 5 MG/1
5 TABLET ORAL EVERY 4 HOURS PRN
Qty: 40 TABLET | Refills: 0 | Status: SHIPPED | OUTPATIENT
Start: 2025-01-10

## 2025-01-10 RX ORDER — IBUPROFEN 600 MG/1
600 TABLET ORAL EVERY 6 HOURS
Qty: 120 TABLET | Refills: 0 | Status: SHIPPED | OUTPATIENT
Start: 2025-01-10 | End: 2025-02-09

## 2025-01-10 RX ORDER — DEXTROMETHORPHAN HYDROBROMIDE, GUAIFENESIN 5; 100 MG/5ML; MG/5ML
650 LIQUID ORAL EVERY 8 HOURS
Qty: 90 TABLET | Refills: 0 | Status: SHIPPED | OUTPATIENT
Start: 2025-01-10 | End: 2025-02-09

## 2025-01-10 RX ADMIN — IBUPROFEN 600 MG: 600 TABLET, FILM COATED ORAL at 05:01

## 2025-01-10 RX ADMIN — OXYCODONE HYDROCHLORIDE 5 MG: 5 TABLET ORAL at 12:01

## 2025-01-10 RX ADMIN — OXYCODONE HYDROCHLORIDE 5 MG: 5 TABLET ORAL at 04:01

## 2025-01-10 RX ADMIN — DOCUSATE SODIUM 100 MG: 100 CAPSULE, LIQUID FILLED ORAL at 08:01

## 2025-01-10 RX ADMIN — ACETAMINOPHEN 1000 MG: 500 TABLET, FILM COATED ORAL at 04:01

## 2025-01-10 RX ADMIN — IBUPROFEN 600 MG: 600 TABLET, FILM COATED ORAL at 12:01

## 2025-01-10 RX ADMIN — IBUPROFEN 600 MG: 600 TABLET, FILM COATED ORAL at 11:01

## 2025-01-10 NOTE — PROGRESS NOTES
Progress Note  UroGynecology    Admit Date: 1/9/2025  LOS: 0    Reason for Admission:  S/P robot-assisted surgical procedure    SUBJECTIVE:     Ashley Whitley is a 54 y.o.  who is POD# 1 s/p RA for the treatment of RA-TLH/BS/SCP/MUS/Cysto.    Pt doing well this morning. Pain is well controlled, required 3 oxycodone IR PRN overnight. She is tolerating a regular diet without N/V. Voiding via gay. Not yet ambulating. Passing flatus. She is not yet having bowel movements.    OBJECTIVE:     Vital Signs   Temp:  [97.1 °F (36.2 °C)-98.5 °F (36.9 °C)] 98.5 °F (36.9 °C)  Pulse:  [59-91] 72  Resp:  [16-20] 20  SpO2:  [95 %-99 %] 95 %  BP: ()/(50-80) 92/57      Intake/Output Summary (Last 24 hours) at 1/10/2025 0550  Last data filed at 1/10/2025 0422  Gross per 24 hour   Intake 3136.28 ml   Output 1150 ml   Net 1986.28 ml       Physical Exam:  Gen: A&Ox3, NAD  Pulm: Normal respiratory effort  Abd: Soft, non-distended, non-tender to palpation without rebound or guarding  Inc: Clean, dry and intact  Ext: No peripheral edema, TEDs/SCDs in place  : Gay in place draining clear yellow urine     Laboratory:  Recent Results (from the past 24 hours)   POCT glucose    Collection Time: 01/09/25  8:44 AM   Result Value Ref Range    POCT Glucose 92 70 - 110 mg/dL   Type & Screen    Collection Time: 01/09/25  8:51 AM   Result Value Ref Range    Group & Rh A POS     Indirect Flakita NEG     Specimen Outdate 01/12/2025 23:59        Diagnostic Results:  None    ASSESSMENT/PLAN:     Active Hospital Problems    Diagnosis  POA    *S/P robot-assisted hysterectomy, salpingectomy, sacrocolpopexy, midurethral sling, and cystoscopy [Z98.890]  Not Applicable    Uterovaginal prolapse [N81.4]  Yes      Resolved Hospital Problems   No resolved problems to display.       Assessment: 54 y.o. who is  POD# 1 s/p RA for the treatment of RA-TLH/BS/SCP/MUS/Cysto recovering well from surgery    Plan:   1. Post-op   - Routine post-op advances:  Ambulating, Spontaneously voiding  - Continue PRN pain medications  - D/C gay after breakfast and perform active voiding trial  - Encourage ambulation after breakfast  - Encourage IS  - CBC and BMP pending  - UOP adequate @ 1.36 cc/kg/hr  - Will discontinue IVF this AM  - Antiemetics prn nausea/vomiting.    2. HLD  - No home meds    3. Mood disorder  - Mood stable  - Continue home med:  desvenlafaxine     Dispo: As patient meets appropriate post-op milestones, plan for discharge to home when appropriate.    Arely Jean Baptiste MD PGY-3  Obstetrics and Gynecology

## 2025-01-10 NOTE — PLAN OF CARE
Case Management Final Discharge Note      Discharge Disposition: Home    New DME ordered / company name: None    Relevant SDOH / Transition of Care Barriers:  None    Person available to provide assistance at home when needed and their contact information: Independent/    Scheduled followup appointment: PCP on AVS    Referrals placed: None    Transportation: Family    Patient and family educated on discharge services and updated on DC plan. Bedside RN notified, patient clear to discharge from Case Management Perspective.      01/10/25 1146   Final Note   Assessment Type Final Discharge Note   Anticipated Discharge Disposition Home   Hospital Resources/Appts/Education Provided Provided patient/caregiver with written discharge plan information;Appointments scheduled and added to AVS   Post-Acute Status   Discharge Delays None known at this time     [facility] - [facility] ([address])  Discharge Final Note    Primary Care Provider: Dori Eden MD    Expected Discharge Date: 1/10/2025    Final Discharge Note (most recent)       Final Note - 01/10/25 1146          Final Note    Assessment Type Final Discharge Note (P)      Anticipated Discharge Disposition Home or Self Care (P)      Hospital Resources/Appts/Education Provided Provided patient/caregiver with written discharge plan information;Appointments scheduled and added to AVS (P)         Post-Acute Status    Discharge Delays None known at this time (P)                      Important Message from Medicare             Contact Info       Michael Borrego MD   Specialty: UroGynecology , Gynecology    [facility] [address]   Phone: [phone]       Next Steps: Go in 6 week(s)    Instructions: For post operative visit

## 2025-01-10 NOTE — DISCHARGE SUMMARY
Discharge Summary  Gynecology      Admit Date: 1/9/2025    Discharge Date and Time: 1/10/2025     Attending Physician: Michael Borrego MD    Principal Diagnoses: S/P robot-assisted surgical procedure    Active Hospital Problems    Diagnosis  POA    *S/P robot-assisted hysterectomy, salpingectomy, sacrocolpopexy, midurethral sling, and cystoscopy [Z98.890]  Not Applicable    Uterovaginal prolapse [N81.4]  Yes      Resolved Hospital Problems   No resolved problems to display.       Procedures: Procedure(s) (LRB):  ROBOTIC HYSTERECTOMY,WITH SALPINGECTOMY (Bilateral)  XI ROBOTIC SACROCOLPOPEXY, ABDOMINAL (N/A)  SLING, MIDURETHRAL (N/A)  CYSTOSCOPY (N/A)    Discharged Condition: good    Hospital Course:   1/9: S/p RA-TLH/BS/SCP/MUS/Cysto. Admitted for post op recovery.  1/10: NAEO. Patient meeting all post op milestones. Passed active voiding trial. Stable for discharge. Will f/u for post op visit in 6 week.    Consults: None    Significant Diagnostic Studies:  Recent Labs   Lab 01/10/25  0530   WBC 7.83   HGB 10.5*   HCT 32.5*   MCV 97         Recent Labs   Lab 01/10/25  0530      K 3.7   *   CO2 19*   BUN 7   CREATININE 0.7   GLU 90     Treatments:   1. Surgery as above    Disposition: Home or Self Care    Patient Instructions:   Current Discharge Medication List        START taking these medications    Details   acetaminophen (TYLENOL) 650 MG TbSR Take 1 tablet (650 mg total) by mouth every 8 (eight) hours.  Qty: 90 tablet, Refills: 0      oxyCODONE (ROXICODONE) 5 MG immediate release tablet Take 1 tablet (5 mg total) by mouth every 4 (four) hours as needed for Pain.  Qty: 40 tablet, Refills: 0    Comments: Quantity prescribed more than 7 day supply? No  Associated Diagnoses: S/P robot-assisted surgical procedure           CONTINUE these medications which have CHANGED    Details   ibuprofen (ADVIL,MOTRIN) 600 MG tablet Take 1 tablet (600 mg total) by mouth every 6 (six) hours.  Qty: 120 tablet,  Refills: 0           CONTINUE these medications which have NOT CHANGED    Details   calcium carbonate/vitamin D3 (VITAMIN D-3 ORAL) Take by mouth once daily.      CALCIUM ORAL Take by mouth once daily.      CMPD testosterone proprionate 2% in vanicream       desvenlafaxine succinate (PRISTIQ) 50 MG Tb24 Take 1 tablet by mouth every morning.      estradiol 0.05 mg/24 hr td ptsw (VIVELLE-DOT) 0.05 mg/24 hr 1 patch twice a week.      MAGNESIUM ORAL Take by mouth once daily.      metFORMIN (GLUCOPHAGE) 500 MG tablet Take 500 mg by mouth every evening.      omega-3 fatty acids/fish oil (FISH OIL-OMEGA-3 FATTY ACIDS) 300-1,000 mg capsule Take by mouth once daily.      THYROID ORAL Take by mouth once daily. Thyroid Support           STOP taking these medications       estradioL (ESTRACE) 0.01 % (0.1 mg/gram) vaginal cream Comments:   Reason for Stopping:         progesterone (PROMETRIUM) 200 MG capsule Comments:   Reason for Stopping:         UNABLE TO FIND Comments:   Reason for Stopping:               Discharge Procedure Orders   Diet general     Lifting restrictions   Order Comments: No lifting greater than 15 pounds for six weeks.     Other restrictions (specify):   Order Comments: PELVIC REST (IF YOU HAD A HYSTERECTOMY):  No douching, tampons, or intercourse until cleared by Dr. Borrego.    If prescribed, vaginal estrogen cream may be used during the postoperative period.     DRIVING:  No driving while on narcotics. Driving may be resumed initially with a competent passenger one to two weeks after surgery if no longer taking narcotics.     EXERCISE:  For six weeks your exercise should be limited to walking. You may walk as far as you wish, as long as you increase your level of exertion gradually and avoid slippery surfaces. You may climb stairs as needed to get around, but should not use stair climbing for exercise.     Remove dressing in 24 hours   Order Comments: If you have a bandage on wound, you may remove it the  day after dismissal.  If you had steri-strips remove them once they begin to peel off (usually 2 weeks).  If your steri-strips still haven't come off in 2 weeks, please remove them.  Keep incision clean and dry.  Inspect the incision daily for signs and symptoms of infection.     Wound care routine (specify)   Order Comments: WOUND CARE:  If you have a band-aid or bandage on your wound, you may remove it the day after dismissal.  You may wash the wound with mild soap and water.   You may shower at any time but should avoid immersing any abdominal incisions in water for at least two weeks after surgery or until the wound is completely healed. If given, please shower with Hibiclens soap until bottle is completely finished. Keep your wound clean and dry.  You should observe your incision for signs of infection which include redness, warmth, drainage or fever.     Call MD for:  temperature >100.4     Call MD for:  persistent nausea and vomiting     Call MD for:  severe uncontrolled pain     Call MD for:  difficulty breathing, headache or visual disturbances     Call MD for:  redness, tenderness, or signs of infection (pain, swelling, redness, odor or green/yellow discharge around incision site)     Call MD for:  hives     Call MD for:   Order Comments: inability to void,urine is ketchup colored or you have large clots, vaginal bleeding is heavier than a period.    VAGINAL DISCHARGE: You may develop a vaginal discharge and intermittent vaginal spotting after surgery and up to 6 weeks postoperatively.  The discharge may have an odor and may change in color but it is normal.  This is due to dissolving stiches.  Contact your surgical team if you develop vaginal or vulvar irritation along with a discharge.  Also contact your surgical team if you have vaginal discharge that smells like urine or stool.    PAIN MEDICATIONS:   Take your pain medications as instructed. It is best to take pain medications before your pain becomes  severe. This will allow you to take less medication yet have better pain relief. For the first 2 or 3 days it may be helpful to take your pain medications on a regular schedule (e.g. every 4 to 6 hours). This will help you to keep your pain under better control. You should then begin to take fewer medications each day until you no longer need them. Do not take pain medication on an empty stomach. This may lead to nausea and vomiting.     Activity as tolerated   Order Comments: PELVIC REST:  No douching, tampons, or intercourse for 6 weeks.    If prescribed, vaginal estrogen cream may be used during the postoperative period.     DRIVING:  No driving while on narcotics. Driving may be resumed initially with a competent passenger one to two weeks after surgery if no longer taking narcotics.     EXERCISE:  For six weeks your exercise should be limited to walking. You may walk as far as you wish, as long as you increase your level of exertion gradually and avoid slippery surfaces. You may climb stairs as needed to get around, but should not use stair climbing for exercise.     Shower on day dressing removed (No bath)   Order Comments: You may shower at any time but should avoid immersing any abdominal incisions in water for at least 2 weeks after surgery or until the wound is completely healed.  If given, please shower with Hibaclens soap until bottle is completely finish        Follow-up Information       Michael Borrego MD. Go in 6 week(s).    Specialties: UroGynecology , Gynecology  Why: For post operative visit  Contact information:  [facility] [facility] [id number] [phone]                           Arely Jean Baptiste MD PGY-3  Obstetrics and Gynecology

## 2025-01-14 ENCOUNTER — PATIENT MESSAGE (OUTPATIENT)
Dept: UROGYNECOLOGY | Facility: CLINIC | Age: 55
End: 2025-01-14
Payer: COMMERCIAL

## 2025-01-14 LAB
FINAL PATHOLOGIC DIAGNOSIS: NORMAL
GROSS: NORMAL
Lab: NORMAL

## 2025-01-15 ENCOUNTER — TELEPHONE (OUTPATIENT)
Dept: UROGYNECOLOGY | Facility: CLINIC | Age: 55
End: 2025-01-15
Payer: COMMERCIAL

## 2025-01-15 NOTE — TELEPHONE ENCOUNTER
Contacted patient to relay Dr. Borrego's below message. Patient verbalized understanding and denies other needs at this time. Patient states taking mirilax and dulcolax. Last night drank tea, prune juice, and cereal and has now had bowel movements. Also reports passing gas. Reports no longer needing pain medicine. Call ended.      ----- Message from Michael Borrego MD sent at 1/14/2025  5:38 PM CST -----  Tried to call patient and -no answer. Left VM with patient.     Can you please call patient, let her know surgical pathology was benign/nothing bad going on.  How is she doing from surgery? Any issues? Please let me know--thanks!

## 2025-02-20 ENCOUNTER — OFFICE VISIT (OUTPATIENT)
Dept: UROGYNECOLOGY | Facility: CLINIC | Age: 55
End: 2025-02-20
Payer: COMMERCIAL

## 2025-02-20 VITALS
BODY MASS INDEX: 21.64 KG/M2 | HEIGHT: 64 IN | DIASTOLIC BLOOD PRESSURE: 74 MMHG | SYSTOLIC BLOOD PRESSURE: 113 MMHG | WEIGHT: 126.75 LBS

## 2025-02-20 DIAGNOSIS — K43.2 INCISIONAL HERNIA, WITHOUT OBSTRUCTION OR GANGRENE: ICD-10-CM

## 2025-02-20 DIAGNOSIS — N95.2 VAGINAL ATROPHY: Primary | ICD-10-CM

## 2025-02-20 PROCEDURE — 99024 POSTOP FOLLOW-UP VISIT: CPT | Mod: S$GLB,,, | Performed by: OBSTETRICS & GYNECOLOGY

## 2025-02-20 PROCEDURE — 3078F DIAST BP <80 MM HG: CPT | Mod: CPTII,S$GLB,, | Performed by: OBSTETRICS & GYNECOLOGY

## 2025-02-20 PROCEDURE — 1160F RVW MEDS BY RX/DR IN RCRD: CPT | Mod: CPTII,S$GLB,, | Performed by: OBSTETRICS & GYNECOLOGY

## 2025-02-20 PROCEDURE — 99999 PR PBB SHADOW E&M-EST. PATIENT-LVL IV: CPT | Mod: PBBFAC,,, | Performed by: OBSTETRICS & GYNECOLOGY

## 2025-02-20 PROCEDURE — 1159F MED LIST DOCD IN RCRD: CPT | Mod: CPTII,S$GLB,, | Performed by: OBSTETRICS & GYNECOLOGY

## 2025-02-20 PROCEDURE — 3074F SYST BP LT 130 MM HG: CPT | Mod: CPTII,S$GLB,, | Performed by: OBSTETRICS & GYNECOLOGY

## 2025-02-20 RX ORDER — ESTRADIOL 0.1 MG/G
CREAM VAGINAL
Qty: 42.5 G | Refills: 11 | Status: SHIPPED | OUTPATIENT
Start: 2025-02-20

## 2025-02-20 RX ORDER — PROGESTERONE 200 MG/1
200 CAPSULE ORAL NIGHTLY
COMMUNITY
Start: 2025-02-03

## 2025-02-20 RX ORDER — ALPRAZOLAM 0.25 MG/1
0.25 TABLET ORAL DAILY PRN
COMMUNITY
Start: 2025-02-10 | End: 2025-03-12

## 2025-02-20 NOTE — PROGRESS NOTES
Urogyn follow up  02/23/2025    [facility] - UROGYNECOLOGY  [address] [address]    Ashley Whitley  [id number]  1970      Ashley Whitley is a 54 y.o. here for postop.     Date of Operation: 01/09/2025     Title of Operation:  1)  Robotic-assisted total laparoscopic hysterectomy with bilateral salpingectomy  2)  Robotic-assisted laparoscopic sacral colpopexy with polypropylene mesh  3)  Placement of retropubic tension-free midurethral sling, Advantage Fit ([employer])  4) Cystourethroscopy     Indications for Surgery:  1)  UI:  (--) JC.  (--) UUI.  Has been doing pelvic floor PT.  No UI prior to this.  (--) pads. Daytime frequency: Q 3 hours.  Nocturia: Yes: 0-1/night.   (--) dysuria,  (--) hematuria,  (--) frequent UTIs.  (+) complete bladder emptying.     2)  POP:  Present since March.  Past introitus.  Symptoms:(+)  notices on wiping, bothered by it.  (--) vaginal bleeding. (--) vaginal discharge. (+) sexually active.  (--) dyspareunia.  (+)  Vaginal dryness.  (--) vaginal estrogen use.   --POP-Q:  Aa +1; Ba +1; C -1; Ap 0; Bp 0; D -4.  Genital hiatus 4, perineal body 3, total vaginal length 10-11.      3)  BM:  (--) constipation/straining. Tries not to strain. Was told has redundant colon. Thin caliber.  Just started citrucel  Takes magnesium 500 mg daily, which sometimes causes loose stool. Has SIBO. Has foul-smelling gas.  (--) chronic diarrhea. (--) hematochezia.  (--) fecal incontinence.  (--) fecal smearing/urgency.  (--) complete evacuation.  No splinting. Has bidet to help. No rectal prolapse.   --no major straining/lifting.     Issues include:  Problem List[1]    History since last visit:   GYN: still has slight brownish discharge--old blood; no major discharge, pain, s/sx POP  Bladder issues: No UI, U/F, dysuria; +complete emptying  Bowel issues: no straining/constipation; tried prune juice for initial constipation; no longer  "issue    Medications:  Current Medications[2]    ROS:  As per HPI.      Exam  /74 (Patient Position: Sitting)   Ht 5' 4" (1.626 m)   Wt 57.5 kg (126 lb 12.2 oz)   BMI 21.76 kg/m²   General: alert and oriented, no acute distress  Respiratory: normal respiratory effort  Abd: soft, non-tender, non-distended: 0.5-1 cm hernia at LUQ, reducible, NT.     Pelvic  Ext. Genitalia: normal external genitalia. Normal bartholins and skenes glands  Vagina: + min atrophy. Normal vaginal mucosa without lesions. No discharge noted.   Non-tender bladder base without palpable mass. Cuff intact--still healing. No mesh visible/palpable.  Sling path NT. No mesh visible/palpable.    Cervix: absent  Uterus:  surgically absent, vaginal cuff well healing  Urethra: no masses or tenderness  Urethral meatus: no lesions, caruncle or prolapse.    POP-Q:  deferred. No POP with valsalva.    Impression  1. Vaginal atrophy  estradioL (ESTRACE) 0.01 % (0.1 mg/gram) vaginal cream      2. Incisional hernia, without obstruction or gangrene          We reviewed the above issues and discussed options for short-term versus long-term management of their problems.   Plan:   Postop: healing well.  Can start adding back activity, swimming, baths. Wait until next visit to add intercourse.   Restart estrogen cream: use 0.5 g with applicator in vagina (as far as can reach internally) nightly.  You can also apply a dime-sized amount with your finger around the vaginal opening and inner lips at same frequency.   Vaginal estrogen may help to decrease pain related to dryness with intercourse and urinary symptoms (urgency/frequency/UTIs) around menopause.   Always keep avoid straining with bowel movements/constipation.   Small incisional hernia:  LUQ  Let us know if worsens/becomes more bothersome  They will follow up with us in 1 month.   20 minutes were spent in face to face time with this patient  90 % of this time was spent in counseling and/or coordination " of care     Michael Borrego MD  [facility]  Division of Female Pelvic Medicine and Reconstructive Surgery  Department of Obstetrics & Gynecology           [1]   Patient Active Problem List  Diagnosis    Open fracture of one rib of left side    Nocturia    Vaginal atrophy    Uterovaginal prolapse    Rectocele, female    Cystocele, midline    Chronic constipation    Irregular bowel habits    S/P robot-assisted hysterectomy, salpingectomy, sacrocolpopexy, midurethral sling, and cystoscopy    Incisional hernia, without obstruction or gangrene   [2]   Current Outpatient Medications:     ALPRAZolam (XANAX) 0.25 MG tablet, Take 0.25 mg by mouth daily as needed., Disp: , Rfl:     calcium carbonate/vitamin D3 (VITAMIN D-3 ORAL), Take by mouth once daily., Disp: , Rfl:     CALCIUM ORAL, Take by mouth once daily., Disp: , Rfl:     CMPD testosterone proprionate 2% in vanicream, , Disp: , Rfl:     desvenlafaxine succinate (PRISTIQ) 50 MG Tb24, Take 1 tablet by mouth every morning., Disp: , Rfl:     estradiol 0.05 mg/24 hr td ptsw (VIVELLE-DOT) 0.05 mg/24 hr, 1 patch twice a week., Disp: , Rfl:     MAGNESIUM ORAL, Take by mouth once daily., Disp: , Rfl:     metFORMIN (GLUCOPHAGE) 500 MG tablet, Take 500 mg by mouth every evening., Disp: , Rfl:     omega-3 fatty acids/fish oil (FISH OIL-OMEGA-3 FATTY ACIDS) 300-1,000 mg capsule, Take by mouth once daily., Disp: , Rfl:     progesterone (PROMETRIUM) 200 MG capsule, Take 200 mg by mouth every evening., Disp: , Rfl:     THYROID ORAL, Take by mouth once daily. Thyroid Support, Disp: , Rfl:     estradioL (ESTRACE) 0.01 % (0.1 mg/gram) vaginal cream, 0.5 grams with applicator or dime-sized amount with finger in vagina nightly x 2 weeks, then twice a week thereafter, Disp: 42.5 g, Rfl: 11    oxyCODONE (ROXICODONE) 5 MG immediate release tablet, Take 1 tablet (5 mg total) by mouth every 4 (four) hours as needed for Pain., Disp: 40 tablet, Rfl: 0

## 2025-02-20 NOTE — PATIENT INSTRUCTIONS
Postop: healing well.  Can start adding back activity, swimming, baths. Wait until next visit to add intercourse.   Restart estrogen cream: use 0.5 g with applicator in vagina (as far as can reach internally) nightly.  You can also apply a dime-sized amount with your finger around the vaginal opening and inner lips at same frequency.   Vaginal estrogen may help to decrease pain related to dryness with intercourse and urinary symptoms (urgency/frequency/UTIs) around menopause.   Always keep avoid straining with bowel movements/constipation.   Small incisional hernia:  LUQ  Let us know if worsens/becomes more bothersome  They will follow up with us in 1 month.

## 2025-02-23 PROBLEM — K43.2 INCISIONAL HERNIA, WITHOUT OBSTRUCTION OR GANGRENE: Status: ACTIVE | Noted: 2025-02-23

## 2025-04-03 ENCOUNTER — OFFICE VISIT (OUTPATIENT)
Dept: UROGYNECOLOGY | Facility: CLINIC | Age: 55
End: 2025-04-03
Payer: COMMERCIAL

## 2025-04-03 VITALS
HEIGHT: 64 IN | SYSTOLIC BLOOD PRESSURE: 109 MMHG | BODY MASS INDEX: 21.79 KG/M2 | HEART RATE: 85 BPM | WEIGHT: 127.63 LBS | DIASTOLIC BLOOD PRESSURE: 74 MMHG

## 2025-04-03 DIAGNOSIS — N95.2 VAGINAL ATROPHY: Primary | ICD-10-CM

## 2025-04-03 PROCEDURE — 99999 PR PBB SHADOW E&M-EST. PATIENT-LVL IV: CPT | Mod: PBBFAC,,, | Performed by: NURSE PRACTITIONER

## 2025-04-03 NOTE — PROGRESS NOTES
Urogyn follow up  04/03/2025    [facility] - UROGYNECOLOGY  [address] [address]    Ashley Whitley  [id number]  1970      Ashley Whitley is a 54 y.o. here for urogyn follow up    Date of Operation: 01/09/2025     Title of Operation:  1)  Robotic-assisted total laparoscopic hysterectomy with bilateral salpingectomy  2)  Robotic-assisted laparoscopic sacral colpopexy with polypropylene mesh  3)  Placement of retropubic tension-free midurethral sling, Advantage Fit ([employer])  4) Cystourethroscopy     Indications for Surgery:  1)  UI:  (--) JC.  (--) UUI.  Has been doing pelvic floor PT.  No UI prior to this.  (--) pads. Daytime frequency: Q 3 hours.  Nocturia: Yes: 0-1/night.   (--) dysuria,  (--) hematuria,  (--) frequent UTIs.  (+) complete bladder emptying.     2)  POP:  Present since March.  Past introitus.  Symptoms:(+)  notices on wiping, bothered by it.  (--) vaginal bleeding. (--) vaginal discharge. (+) sexually active.  (--) dyspareunia.  (+)  Vaginal dryness.  (--) vaginal estrogen use.   --POP-Q:  Aa +1; Ba +1; C -1; Ap 0; Bp 0; D -4.  Genital hiatus 4, perineal body 3, total vaginal length 10-11.      3)  BM:  (--) constipation/straining. Tries not to strain. Was told has redundant colon. Thin caliber.  Just started citrucel  Takes magnesium 500 mg daily, which sometimes causes loose stool. Has SIBO. Has foul-smelling gas.  (--) chronic diarrhea. (--) hematochezia.  (--) fecal incontinence.  (--) fecal smearing/urgency.  (--) complete evacuation.  No splinting. Has bidet to help. No rectal prolapse.   --no major straining/lifting.     Issues include:  Problem List[1]    02/20/2025  GYN: still has slight brownish discharge--old blood; no major discharge, pain, s/sx POP  Bladder issues: No UI, U/F, dysuria; +complete emptying  Bowel issues: no straining/constipation; tried prune juice for initial constipation; no longer issue    Changes since last  "visit  Denies pain, bleeding, or discharge  Bladder issues: No UI, U/F, dysuria; +complete emptying  Bowel issues: no straining/constipation.  Taking magnesium at night  Using estrogen cream     Medications:  Current Medications[2]    ROS:  As per HPI.      Exam  /74 (BP Location: Left arm, Patient Position: Sitting)   Pulse 85   Ht 5' 4" (1.626 m)   Wt 57.9 kg (127 lb 10.3 oz)   BMI 21.91 kg/m²   General: alert and oriented, no acute distress  Respiratory: normal respiratory effort  Abd: soft, non-tender, non-distended: 0.5-1 cm hernia at LUQ, reducible, NT.     Pelvic  Ext. Genitalia: normal external genitalia. Normal bartholins and skenes glands  Vagina: + min atrophy. Normal vaginal mucosa without lesions. No discharge noted.   Non-tender bladder base without palpable mass. Cuff well healed. No mesh visible/palpable.  Sling path NT. No mesh visible/palpable.    Cervix: absent  Uterus:  surgically absent, vaginal cuff well healed  Urethra: no masses or tenderness  Urethral meatus: no lesions, caruncle or prolapse.    POP-Q:  deferred. No POP with valsalva.    Impression  1. Vaginal atrophy            We reviewed the above issues and discussed options for short-term versus long-term management of their problems.   Plan:   Post op well healed-- no lifting > 50 pounds without assistance  Restart estrogen cream: use 0.5 g with applicator in vagina (as far as can reach internally) twice weekly  You can also apply a dime-sized amount with your finger around the vaginal opening and inner lips at same frequency.   Vaginal estrogen may help to decrease pain related to dryness with intercourse and urinary symptoms (urgency/frequency/UTIs) around menopause.   Always keep avoid straining with bowel movements/constipation.   Small incisional hernia:  LUQ  Let us know if worsens/becomes more bothersome  RTC 07/2025      I spent a total of 20 minutes on the day of the visit.        Kristina M Marchand, NP [facility]  Division of Female Pelvic Medicine and Reconstructive Surgery  Department of Obstetrics & Gynecology           [1]   Patient Active Problem List  Diagnosis    Open fracture of one rib of left side    Nocturia    Vaginal atrophy    Uterovaginal prolapse    Rectocele, female    Cystocele, midline    Chronic constipation    Irregular bowel habits    S/P robot-assisted hysterectomy, salpingectomy, sacrocolpopexy, midurethral sling, and cystoscopy    Incisional hernia, without obstruction or gangrene   [2]   Current Outpatient Medications:     calcium carbonate/vitamin D3 (VITAMIN D-3 ORAL), Take by mouth once daily., Disp: , Rfl:     CALCIUM ORAL, Take by mouth once daily., Disp: , Rfl:     CMPD testosterone proprionate 2% in vanicream, , Disp: , Rfl:     desvenlafaxine succinate (PRISTIQ) 50 MG Tb24, Take 1 tablet by mouth every morning., Disp: , Rfl:     estradioL (ESTRACE) 0.01 % (0.1 mg/gram) vaginal cream, 0.5 grams with applicator or dime-sized amount with finger in vagina nightly x 2 weeks, then twice a week thereafter, Disp: 42.5 g, Rfl: 11    estradiol 0.05 mg/24 hr td ptsw (VIVELLE-DOT) 0.05 mg/24 hr, 1 patch twice a week., Disp: , Rfl:     MAGNESIUM ORAL, Take by mouth once daily., Disp: , Rfl:     metFORMIN (GLUCOPHAGE) 500 MG tablet, Take 500 mg by mouth every evening., Disp: , Rfl:     omega-3 fatty acids/fish oil (FISH OIL-OMEGA-3 FATTY ACIDS) 300-1,000 mg capsule, Take by mouth once daily., Disp: , Rfl:     progesterone (PROMETRIUM) 200 MG capsule, Take 200 mg by mouth every evening., Disp: , Rfl:     THYROID ORAL, Take by mouth once daily. Thyroid Support, Disp: , Rfl:     ALPRAZolam (XANAX) 0.25 MG tablet, Take 0.25 mg by mouth daily as needed., Disp: , Rfl:     oxyCODONE (ROXICODONE) 5 MG immediate release tablet, Take 1 tablet (5 mg total) by mouth every 4 (four) hours as needed for Pain., Disp: 40 tablet, Rfl: 0

## 2025-04-03 NOTE — PATIENT INSTRUCTIONS
Post op well healed-- no lifting > 50 pounds without assistance  Restart estrogen cream: use 0.5 g with applicator in vagina (as far as can reach internally) twice weekly  You can also apply a dime-sized amount with your finger around the vaginal opening and inner lips at same frequency.   Vaginal estrogen may help to decrease pain related to dryness with intercourse and urinary symptoms (urgency/frequency/UTIs) around menopause.   Always keep avoid straining with bowel movements/constipation.   Small incisional hernia:  LUQ  Let us know if worsens/becomes more bothersome  RTC 07/2025

## 2025-07-08 ENCOUNTER — OFFICE VISIT (OUTPATIENT)
Dept: UROGYNECOLOGY | Facility: CLINIC | Age: 55
End: 2025-07-08
Payer: COMMERCIAL

## 2025-07-08 VITALS
WEIGHT: 130.06 LBS | BODY MASS INDEX: 22.2 KG/M2 | HEIGHT: 64 IN | DIASTOLIC BLOOD PRESSURE: 70 MMHG | HEART RATE: 69 BPM | SYSTOLIC BLOOD PRESSURE: 111 MMHG

## 2025-07-08 DIAGNOSIS — N95.2 VAGINAL ATROPHY: Primary | ICD-10-CM

## 2025-07-08 PROCEDURE — 99213 OFFICE O/P EST LOW 20 MIN: CPT | Mod: S$GLB,,, | Performed by: NURSE PRACTITIONER

## 2025-07-08 PROCEDURE — 99999 PR PBB SHADOW E&M-EST. PATIENT-LVL IV: CPT | Mod: PBBFAC,,, | Performed by: NURSE PRACTITIONER

## 2025-07-08 PROCEDURE — 3078F DIAST BP <80 MM HG: CPT | Mod: CPTII,S$GLB,, | Performed by: NURSE PRACTITIONER

## 2025-07-08 PROCEDURE — 3074F SYST BP LT 130 MM HG: CPT | Mod: CPTII,S$GLB,, | Performed by: NURSE PRACTITIONER

## 2025-07-08 PROCEDURE — 1160F RVW MEDS BY RX/DR IN RCRD: CPT | Mod: CPTII,S$GLB,, | Performed by: NURSE PRACTITIONER

## 2025-07-08 PROCEDURE — 3044F HG A1C LEVEL LT 7.0%: CPT | Mod: CPTII,S$GLB,, | Performed by: NURSE PRACTITIONER

## 2025-07-08 PROCEDURE — 1159F MED LIST DOCD IN RCRD: CPT | Mod: CPTII,S$GLB,, | Performed by: NURSE PRACTITIONER

## 2025-07-08 PROCEDURE — 3008F BODY MASS INDEX DOCD: CPT | Mod: CPTII,S$GLB,, | Performed by: NURSE PRACTITIONER

## 2025-07-08 NOTE — PROGRESS NOTES
Urogyn follow up  07/08/2025    [facility] - UROGYNECOLOGY  [address] [id number]    Ashley Whitley  [id number]  1970      Ashley Whitley is a 55 y.o. here for urogyn follow up for vaginal dryness s/p rhyst/bso/asc/sling    Date of Operation: 01/09/2025     Title of Operation:  1)  Robotic-assisted total laparoscopic hysterectomy with bilateral salpingectomy  2)  Robotic-assisted laparoscopic sacral colpopexy with polypropylene mesh  3)  Placement of retropubic tension-free midurethral sling, Advantage Fit (Souche)  4) Cystourethroscopy     Indications for Surgery:  1)  UI:  (--) JC.  (--) UUI.  Has been doing pelvic floor PT.  No UI prior to this.  (--) pads. Daytime frequency: Q 3 hours.  Nocturia: Yes: 0-1/night.   (--) dysuria,  (--) hematuria,  (--) frequent UTIs.  (+) complete bladder emptying.     2)  POP:  Present since March.  Past introitus.  Symptoms:(+)  notices on wiping, bothered by it.  (--) vaginal bleeding. (--) vaginal discharge. (+) sexually active.  (--) dyspareunia.  (+)  Vaginal dryness.  (--) vaginal estrogen use.   --POP-Q:  Aa +1; Ba +1; C -1; Ap 0; Bp 0; D -4.  Genital hiatus 4, perineal body 3, total vaginal length 10-11.      3)  BM:  (--) constipation/straining. Tries not to strain. Was told has redundant colon. Thin caliber.  Just started citrucel  Takes magnesium 500 mg daily, which sometimes causes loose stool. Has SIBO. Has foul-smelling gas.  (--) chronic diarrhea. (--) hematochezia.  (--) fecal incontinence.  (--) fecal smearing/urgency.  (--) complete evacuation.  No splinting. Has bidet to help. No rectal prolapse.   --no major straining/lifting.     Issues include:  Problem List[1]    02/20/2025  GYN: still has slight brownish discharge--old blood; no major discharge, pain, s/sx POP  Bladder issues: No UI, U/F, dysuria; +complete emptying  Bowel issues: no straining/constipation; tried prune juice for initial constipation;  "no longer issue    04/03/2025  Denies pain, bleeding, or discharge  Bladder issues: No UI, U/F, dysuria; +complete emptying  Bowel issues: no straining/constipation.  Taking magnesium at night  Using estrogen cream     Changes since last visit;  Denies pain, bleeding, or discharge  Bladder issues: No UI, U/F, dysuria; +complete emptying  Bowel issues: no straining/constipation.  Taking magnesium at night  Using estrogen cream     Medications:  Current Medications[2]    ROS:  As per HPI.      Exam  /70 (BP Location: Left arm, Patient Position: Sitting)   Pulse 69   Ht 5' 4" (1.626 m)   Wt 59 kg (130 lb 1.1 oz)   BMI 22.33 kg/m²   General: alert and oriented, no acute distress  Respiratory: normal respiratory effort  Abd: soft, non-tender, non-distended: 0.5-1 cm hernia at LUQ, reducible, NT.     Pelvic  Ext. Genitalia: normal external genitalia. Normal bartholins and skenes glands  Vagina: + min atrophy. Normal vaginal mucosa without lesions. No discharge noted.   Non-tender bladder base without palpable mass. No mesh visible/palpable.  Sling path NT. No mesh visible/palpable.    Cervix: absent  Uterus:  surgically absent  Urethra: no masses or tenderness  Urethral meatus: no lesions, caruncle or prolapse.    POP-Q:  deferred. No POP with valsalva.    Impression  1. Vaginal atrophy              We reviewed the above issues and discussed options for short-term versus long-term management of their problems.   Plan:   Get help lifting > 50 pounds without assistance  Continue estrogen cream twice weekly  Always keep avoid straining with bowel movements/constipation.   RTC 01/2026      I spent a total of 20 minutes on the day of the visit.        Liza Dumont NP  [facility]  Division of Female Pelvic Medicine and Reconstructive Surgery  Department of Obstetrics & Gynecology           [1]   Patient Active Problem List  Diagnosis    Open fracture of one rib of left side    Nocturia    Vaginal " atrophy    Uterovaginal prolapse    Rectocele, female    Cystocele, midline    Chronic constipation    Irregular bowel habits    S/P robot-assisted hysterectomy, salpingectomy, sacrocolpopexy, midurethral sling, and cystoscopy    Incisional hernia, without obstruction or gangrene   [2]   Current Outpatient Medications:     calcium carbonate/vitamin D3 (VITAMIN D-3 ORAL), Take by mouth once daily., Disp: , Rfl:     CALCIUM ORAL, Take by mouth once daily., Disp: , Rfl:     CMPD testosterone proprionate 2% in vanicream, , Disp: , Rfl:     desvenlafaxine succinate (PRISTIQ) 50 MG Tb24, Take 1 tablet by mouth every morning., Disp: , Rfl:     estradioL (ESTRACE) 0.01 % (0.1 mg/gram) vaginal cream, 0.5 grams with applicator or dime-sized amount with finger in vagina nightly x 2 weeks, then twice a week thereafter, Disp: 42.5 g, Rfl: 11    estradiol 0.05 mg/24 hr td ptsw (VIVELLE-DOT) 0.05 mg/24 hr, 1 patch twice a week., Disp: , Rfl:     MAGNESIUM ORAL, Take by mouth once daily., Disp: , Rfl:     metFORMIN (GLUCOPHAGE) 500 MG tablet, Take 500 mg by mouth every evening., Disp: , Rfl:     omega-3 fatty acids/fish oil (FISH OIL-OMEGA-3 FATTY ACIDS) 300-1,000 mg capsule, Take by mouth once daily., Disp: , Rfl:     progesterone (PROMETRIUM) 200 MG capsule, Take 200 mg by mouth every evening., Disp: , Rfl:     THYROID ORAL, Take by mouth once daily. Thyroid Support, Disp: , Rfl:     ALPRAZolam (XANAX) 0.25 MG tablet, Take 0.25 mg by mouth daily as needed., Disp: , Rfl:

## 2025-07-08 NOTE — PATIENT INSTRUCTIONS
Get help lifting > 50 pounds without assistance  Continue estrogen cream twice weekly  Always keep avoid straining with bowel movements/constipation.   RTC 01/2026

## (undated) DEVICE — POSITIONER HEEL FOAM CONVOLTD

## (undated) DEVICE — SOL NORMAL USPCA 0.9%

## (undated) DEVICE — TRAY DO THE ROBOT

## (undated) DEVICE — SET IRR CYSTO Y DRP CHMBR 80IN

## (undated) DEVICE — TOWEL OR DISP STRL BLUE 4/PK

## (undated) DEVICE — SYR IRRIGATION BULB STER 60ML

## (undated) DEVICE — SOL IRRI STRL WATER 1000ML

## (undated) DEVICE — SOL IRR SOD CHL .9% POUR

## (undated) DEVICE — NDL HYPO REG 25G X 1 1/2

## (undated) DEVICE — BLADE SURG STAINLESS STEEL #15

## (undated) DEVICE — PORT ACCESS 8MM W/120MM LOW

## (undated) DEVICE — Device

## (undated) DEVICE — SYR 10CC LUER LOCK

## (undated) DEVICE — SUT MCRYL PLUS 4-0 PS2 27IN

## (undated) DEVICE — GLOVE SENSICARE PI GRN 7

## (undated) DEVICE — PAD PREP CUFFED NS 24X48IN

## (undated) DEVICE — TIP RUMI MANIPULATOR LAVENDER

## (undated) DEVICE — APPLICATOR HEMOBLAST LAPSCP

## (undated) DEVICE — DRAPE STERI INSTRUMENT 1018

## (undated) DEVICE — GLOVE SENSICARE PI SURG 7

## (undated) DEVICE — COVER TIP CURVED SCISSORS XI

## (undated) DEVICE — TROCAR ENDOPATH XCEL 5X100MM

## (undated) DEVICE — OBTURATOR BLADELESS 8MM XI CLR

## (undated) DEVICE — SUT ETHIBOND EXCEL 0 CT2 30

## (undated) DEVICE — DRAPE COLUMN DAVINCI XI

## (undated) DEVICE — GOWN NONREINF SET-IN SLV XL

## (undated) DEVICE — BELLOW CANN HEMOBLAST 1.65GR

## (undated) DEVICE — SOL ELECTROLUBE ANTI-STIC

## (undated) DEVICE — SOL POVIDONE PREP IODINE 4 OZ

## (undated) DEVICE — INSERT CUSHIONPRONE VIEW LARGE

## (undated) DEVICE — DRAPE ADPT RUMI II ADVINCULA

## (undated) DEVICE — DRAPE ARM DAVINCI XI

## (undated) DEVICE — SYS SEE SHARP SCP ANTIFG LNG

## (undated) DEVICE — IRRIGATOR ENDOSCOPY DISP.

## (undated) DEVICE — OCCLUDER COLPO-PNEUMO STERILE

## (undated) DEVICE — TIP RUMI BLUE DISPOSABLE 5/BX

## (undated) DEVICE — SUT PDS II 2-0 CT-2 VIL

## (undated) DEVICE — SUT VICRYL PLUS 0 CT1 36IN

## (undated) DEVICE — DEVICE SNAPSECURE FOL CATH

## (undated) DEVICE — SOL POVIDONE SCRUB IODINE 4 OZ

## (undated) DEVICE — SEAL CANN UNIVERSAL 5-12MM

## (undated) DEVICE — SOL STRL WATER INJ 1000ML BG

## (undated) DEVICE — PAD SUREFIT GRND ELECTRD 10FT

## (undated) DEVICE — SET TRI-LUMEN FILTERED TUBE

## (undated) DEVICE — ADHESIVE DERMABOND ADVANCED

## (undated) DEVICE — CATH FOL IC 3WAY 16F 5CCLF

## (undated) DEVICE — SUT VICRYL 0 27 CT-2

## (undated) DEVICE — KIT WING PAD POSITIONING

## (undated) DEVICE — SYR SALINE FLSH PRFL STRL 10ML